# Patient Record
Sex: MALE | Race: WHITE | Employment: OTHER | URBAN - METROPOLITAN AREA
[De-identification: names, ages, dates, MRNs, and addresses within clinical notes are randomized per-mention and may not be internally consistent; named-entity substitution may affect disease eponyms.]

---

## 2022-06-22 ENCOUNTER — APPOINTMENT (OUTPATIENT)
Dept: GENERAL RADIOLOGY | Age: 67
End: 2022-06-22
Payer: MEDICARE

## 2022-06-22 ENCOUNTER — APPOINTMENT (OUTPATIENT)
Dept: CT IMAGING | Age: 67
End: 2022-06-22
Payer: MEDICARE

## 2022-06-22 ENCOUNTER — HOSPITAL ENCOUNTER (EMERGENCY)
Age: 67
Discharge: HOME | End: 2022-06-22
Attending: EMERGENCY MEDICINE
Payer: MEDICARE

## 2022-06-22 VITALS
HEART RATE: 55 BPM | WEIGHT: 220 LBS | BODY MASS INDEX: 31.57 KG/M2 | RESPIRATION RATE: 18 BRPM | OXYGEN SATURATION: 95 % | TEMPERATURE: 97.5 F | SYSTOLIC BLOOD PRESSURE: 130 MMHG | DIASTOLIC BLOOD PRESSURE: 76 MMHG

## 2022-06-22 DIAGNOSIS — S42.202A CLOSED FRACTURE OF PROXIMAL END OF LEFT HUMERUS, UNSPECIFIED FRACTURE MORPHOLOGY, INITIAL ENCOUNTER: ICD-10-CM

## 2022-06-22 DIAGNOSIS — S09.90XA INJURY OF HEAD, INITIAL ENCOUNTER: ICD-10-CM
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
## 2022-06-22 DIAGNOSIS — T14.8XXA ABRASION: ICD-10-CM

## 2022-06-22 LAB
ABSOLUTE EOS #: 0.3 K/UL (ref 0–0.44)
ABSOLUTE IMMATURE GRANULOCYTE: 0.02 K/UL (ref 0–0.3)
ABSOLUTE LYMPH #: 2.11 K/UL (ref 1.1–3.7)
ABSOLUTE MONO #: 0.75 K/UL (ref 0.1–1.2)
ANION GAP SERPL CALCULATED.3IONS-SCNC: 10 MMOL/L (ref 9–17)
BASOPHILS # BLD: 1 % (ref 0–2)
BASOPHILS ABSOLUTE: 0.05 K/UL (ref 0–0.2)
BUN BLDV-MCNC: 15 MG/DL (ref 8–23)
BUN/CREAT BLD: 15 (ref 9–20)
CALCIUM SERPL-MCNC: 8.9 MG/DL (ref 8.6–10.4)
CHLORIDE BLD-SCNC: 102 MMOL/L (ref 98–107)
CO2: 26 MMOL/L (ref 20–31)
CREAT SERPL-MCNC: 0.98 MG/DL (ref 0.7–1.2)
EOSINOPHILS RELATIVE PERCENT: 4 % (ref 1–4)
GFR AFRICAN AMERICAN: >60 ML/MIN
GFR NON-AFRICAN AMERICAN: >60 ML/MIN
GFR SERPL CREATININE-BSD FRML MDRD: ABNORMAL ML/MIN/{1.73_M2}
GLUCOSE BLD-MCNC: 117 MG/DL (ref 70–99)
HCT VFR BLD CALC: 48.9 % (ref 40.7–50.3)
HEMOGLOBIN: 15.5 G/DL (ref 13–17)
IMMATURE GRANULOCYTES: 0 %
LYMPHOCYTES # BLD: 30 % (ref 24–43)
MAGNESIUM: 2.2 MG/DL (ref 1.6–2.6)
MCH RBC QN AUTO: 30.3 PG (ref 25.2–33.5)
MCHC RBC AUTO-ENTMCNC: 31.7 G/DL (ref 28.4–34.8)
MCV RBC AUTO: 95.7 FL (ref 82.6–102.9)
MONOCYTES # BLD: 11 % (ref 3–12)
NRBC AUTOMATED: 0 PER 100 WBC
PDW BLD-RTO: 12.6 % (ref 11.8–14.4)
PLATELET # BLD: 177 K/UL (ref 138–453)
PMV BLD AUTO: 10.7 FL (ref 8.1–13.5)
POTASSIUM SERPL-SCNC: 4.2 MMOL/L (ref 3.7–5.3)
RBC # BLD: 5.11 M/UL (ref 4.21–5.77)
SEG NEUTROPHILS: 54 % (ref 36–65)
SEGMENTED NEUTROPHILS ABSOLUTE COUNT: 3.77 K/UL (ref 1.5–8.1)
SODIUM BLD-SCNC: 138 MMOL/L (ref 135–144)
WBC # BLD: 7 K/UL (ref 3.5–11.3)

## 2022-06-22 PROCEDURE — 80048 BASIC METABOLIC PNL TOTAL CA: CPT

## 2022-06-22 PROCEDURE — 72125 CT NECK SPINE W/O DYE: CPT

## 2022-06-22 PROCEDURE — 85025 COMPLETE CBC W/AUTO DIFF WBC: CPT

## 2022-06-22 PROCEDURE — 96375 TX/PRO/DX INJ NEW DRUG ADDON: CPT

## 2022-06-22 PROCEDURE — 96374 THER/PROPH/DIAG INJ IV PUSH: CPT

## 2022-06-22 PROCEDURE — 71045 X-RAY EXAM CHEST 1 VIEW: CPT

## 2022-06-22 PROCEDURE — 70450 CT HEAD/BRAIN W/O DYE: CPT

## 2022-06-22 PROCEDURE — 99285 EMERGENCY DEPT VISIT HI MDM: CPT

## 2022-06-22 PROCEDURE — 93005 ELECTROCARDIOGRAM TRACING: CPT | Performed by: NURSE PRACTITIONER

## 2022-06-22 PROCEDURE — 6360000002 HC RX W HCPCS: Performed by: NURSE PRACTITIONER

## 2022-06-22 PROCEDURE — 73080 X-RAY EXAM OF ELBOW: CPT

## 2022-06-22 PROCEDURE — 72170 X-RAY EXAM OF PELVIS: CPT

## 2022-06-22 PROCEDURE — 73030 X-RAY EXAM OF SHOULDER: CPT

## 2022-06-22 PROCEDURE — 83735 ASSAY OF MAGNESIUM: CPT

## 2022-06-22 RX ORDER — HYDROCODONE BITARTRATE AND ACETAMINOPHEN 5; 325 MG/1; MG/1
1 TABLET ORAL EVERY 8 HOURS PRN
Qty: 20 TABLET | Refills: 0 | Status: SHIPPED | OUTPATIENT
Start: 2022-06-22 | End: 2022-06-29

## 2022-06-22 RX ORDER — ONDANSETRON 2 MG/ML
4 INJECTION INTRAMUSCULAR; INTRAVENOUS ONCE
Status: COMPLETED | OUTPATIENT
Start: 2022-06-22 | End: 2022-06-22

## 2022-06-22 RX ORDER — MORPHINE SULFATE 2 MG/ML
2 INJECTION, SOLUTION INTRAMUSCULAR; INTRAVENOUS ONCE
Status: COMPLETED | OUTPATIENT
Start: 2022-06-22 | End: 2022-06-22

## 2022-06-22 RX ADMIN — MORPHINE SULFATE 2 MG: 2 INJECTION, SOLUTION INTRAMUSCULAR; INTRAVENOUS at 11:15

## 2022-06-22 RX ADMIN — ONDANSETRON 4 MG: 2 INJECTION INTRAMUSCULAR; INTRAVENOUS at 11:15

## 2022-06-22 ASSESSMENT — ENCOUNTER SYMPTOMS
COLOR CHANGE: 0
TROUBLE SWALLOWING: 0
FACIAL SWELLING: 0
ABDOMINAL PAIN: 0
NAUSEA: 0
SHORTNESS OF BREATH: 0
VOMITING: 0

## 2022-06-22 ASSESSMENT — PAIN SCALES - GENERAL: PAINLEVEL_OUTOF10: 6

## 2022-06-22 NOTE — ED PROVIDER NOTES
eMERGENCY dEPARTMENT eNCOUnter   Independent Attestation     Pt Name: Hermila Mckeon  MRN: [id number]  [place] 1955  Date of evaluation: 6/22/22     Hermila Mckeon is a 77 y.o. male with CC: Fall        This visit was performed by both a physician and an APC. I performed all aspects of the MDM as documented.       The care is provided during an unprecedented national emergency due to the novel coronavirus, Sarah Beth Hammer MD  Attending Emergency Physician           Lizabeth Gutiérrez MD  06/22/22 [id number]

## 2022-06-22 NOTE — ED PROVIDER NOTES
[facility] ED  eMERGENCY dEPARTMENT eNCOUnter      Pt Name: Peter العراقي  MRN: [id number]  [place] 1955  Date of evaluation: 6/22/2022  Provider: BAM Davis CNP    CHIEF COMPLAINT       Chief Complaint   Patient presents with    Fall         HISTORY OF PRESENT ILLNESS  (Location/Symptom, Timing/Onset, Context/Setting, Quality, Duration, Modifying Factors, Severity.)   Peter العراقي is a 77 y.o. male who presents to the emergency department via private auto for a fall x2 today. He is accompanied by his caregiver. He fell coming out of his bedroom this morning, landing on his left side. His caregiver denies LOC. He then fell again in the grass while walking outside to their transport vehicle. He has an abrasion to his right forehead and scalp area from the second fall. He reported mild dizziness. He has a hx of syncope. Denies pain to his neck, back, chest, abdomen, hips, knees. Rates his pain 6/10 at this time. Nursing Notes were reviewed. ALLERGIES     Patient has no known allergies. CURRENT MEDICATIONS       Previous Medications    No medications on file       PAST MEDICAL HISTORY         Diagnosis Date    Diverticulosis     GERD (gastroesophageal reflux disease)     Mental retardation        SURGICAL HISTORY     No past surgical history on file. FAMILY HISTORY     No family history on file. No family status information on file. SOCIAL HISTORY      reports that he has never smoked. He does not have any smokeless tobacco history on file. He reports that he does not drink alcohol and does not use drugs. REVIEW OF SYSTEMS    (2-9 systems for level 4, 10 or more for level 5)     Review of Systems   Constitutional: Negative for chills, diaphoresis, fatigue and fever. HENT: Negative for facial swelling, nosebleeds and trouble swallowing. Respiratory: Negative for shortness of breath. Cardiovascular: Negative for chest pain.    Gastrointestinal: Negative for abdominal pain, nausea and vomiting. Musculoskeletal: Positive for arthralgias and myalgias. Skin: Positive for wound. Negative for color change and rash. Neurological: Positive for headaches. Negative for dizziness, syncope, facial asymmetry, speech difficulty, weakness, light-headedness and numbness. Except as noted above the remainder of the review of systems was reviewed and negative. PHYSICAL EXAM    (up to 7 for level 4, 8 or more for level 5)     ED Triage Vitals   BP Temp Temp src Pulse Resp SpO2 Height Weight   -- -- -- -- -- -- -- --     Physical Exam  Vitals reviewed. Constitutional:       General: He is not in acute distress. Appearance: He is well-developed. He is not diaphoretic. HENT:      Head: Abrasion present. No raccoon eyes or Tipton's sign. Right Ear: External ear normal.      Left Ear: External ear normal.      Nose:      Right Nostril: No epistaxis. Left Nostril: No epistaxis. Eyes:      General: No scleral icterus. Extraocular Movements: Extraocular movements intact. Conjunctiva/sclera: Conjunctivae normal.      Pupils: Pupils are equal, round, and reactive to light. Cardiovascular:      Rate and Rhythm: Normal rate. Pulmonary:      Effort: Pulmonary effort is normal. No respiratory distress. Breath sounds: No stridor. Abdominal:      General: There is no distension. Palpations: Abdomen is soft. Tenderness: There is no abdominal tenderness. There is no guarding. Musculoskeletal:      Right shoulder: No swelling, deformity or tenderness. Normal range of motion. Left shoulder: Tenderness present. No swelling or deformity. Decreased range of motion. Right elbow: No swelling or deformity. Normal range of motion. No tenderness. Left elbow: No swelling or deformity. Decreased range of motion. No tenderness. Right wrist: No swelling, deformity or tenderness. Normal range of motion. Normal pulse. Left wrist: No swelling, deformity or tenderness. Normal range of motion. Normal pulse. Right hand: No swelling, deformity or tenderness. Normal range of motion. Normal capillary refill. Normal pulse. Left hand: No swelling, deformity or tenderness. Normal range of motion. Normal capillary refill. Normal pulse. Cervical back: Neck supple. No swelling, deformity, tenderness or bony tenderness. Thoracic back: No swelling, deformity, tenderness or bony tenderness. Lumbar back: No swelling, deformity, tenderness or bony tenderness. Right hip: No deformity or tenderness. Normal range of motion. Left hip: No deformity or tenderness. Normal range of motion. Right knee: No swelling or deformity. Normal range of motion. No tenderness. Left knee: No swelling or deformity. Normal range of motion. No tenderness. Skin:     General: Skin is warm and dry. Capillary Refill: Capillary refill takes less than 2 seconds. Findings: No rash. Neurological:      Mental Status: He is alert and oriented to person, place, and time. GCS: GCS eye subscore is 4. GCS verbal subscore is 5. GCS motor subscore is 6. Cranial Nerves: Cranial nerves are intact. No cranial nerve deficit. Motor: Motor function is intact. No weakness. Coordination: Coordination is intact. Psychiatric:         Behavior: Behavior normal.           DIAGNOSTIC RESULTS     RADIOLOGY:   Non-plain film images such as CT, Ultrasound and MRI are read by the radiologist. Plain radiographic images are visualized and preliminarily interpreted by the emergency physician with the below findings:    Interpretation per the Radiologist below, if available at the time of this note:    XR PELVIS (1-2 VIEWS)    Result Date: 6/22/2022  EXAMINATION: ONE XRAY VIEW OF THE PELVIS 6/22/2022 9:50 am COMPARISON: None.  HISTORY: ORDERING SYSTEM PROVIDED HISTORY: fall TECHNOLOGIST PROVIDED HISTORY: fall Reason for Exam: Fall with hip pain and SOB. FINDINGS: The bony pelvis is intact. There is no acute osseous abnormality. There is degenerative change of the SI joints and of the hip joints. The surrounding soft tissues are unremarkable. No acute osseous or soft tissue abnormality. XR ELBOW LEFT (MIN 3 VIEWS)    Result Date: 6/22/2022  EXAMINATION: THREE XRAY VIEWS OF THE LEFT ELBOW 6/22/2022 10:32 am COMPARISON: None. HISTORY: ORDERING SYSTEM PROVIDED HISTORY: pain, fall TECHNOLOGIST PROVIDED HISTORY: pain, fall Reason for Exam: Fall with pain to left shoulder. Limited ROM. Best films. FINDINGS: No fracture, dislocation, or joint effusion. No acute findings     CT HEAD WO CONTRAST    Result Date: 6/22/2022  EXAMINATION: CT OF THE HEAD WITHOUT CONTRAST  6/22/2022 10:03 am TECHNIQUE: CT of the head was performed without the administration of intravenous contrast. Automated exposure control, iterative reconstruction, and/or weight based adjustment of the mA/kV was utilized to reduce the radiation dose to as low as reasonably achievable. COMPARISON: None. HISTORY: ORDERING SYSTEM PROVIDED HISTORY: injury, weakness TECHNOLOGIST PROVIDED HISTORY: injury, weakness Decision Support Exception - unselect if not a suspected or confirmed emergency medical condition->Emergency Medical Condition (MA) Reason for Exam: Pt fell 2 times today, abrasion to forehead. FINDINGS: BRAIN/VENTRICLES: There is no acute intracranial hemorrhage, mass effect or midline shift. No abnormal extra-axial fluid collection. The gray-white differentiation is maintained without evidence of an acute infarct. There is no evidence of hydrocephalus. ORBITS: The visualized portion of the orbits demonstrate no acute abnormality. SINUSES: The visualized paranasal sinuses and mastoid air cells demonstrate no acute abnormality. SOFT TISSUES/SKULL:  No acute abnormality of the visualized skull or soft tissues. No acute intracranial abnormality. RECOMMENDATIONS: Unavailable     CT CERVICAL SPINE WO CONTRAST    Result Date: 6/22/2022  EXAMINATION: CT OF THE CERVICAL SPINE WITHOUT CONTRAST 6/22/2022 10:03 am TECHNIQUE: CT of the cervical spine was performed without the administration of intravenous contrast. Multiplanar reformatted images are provided for review. Automated exposure control, iterative reconstruction, and/or weight based adjustment of the mA/kV was utilized to reduce the radiation dose to as low as reasonably achievable. COMPARISON: None. HISTORY: ORDERING SYSTEM PROVIDED HISTORY: fall TECHNOLOGIST PROVIDED HISTORY: fall Decision Support Exception - unselect if not a suspected or confirmed emergency medical condition->Emergency Medical Condition (MA) Reason for Exam: Pt fell 2 times today, abrasion to forehead. FINDINGS: BONES/ALIGNMENT: There is no acute fracture or traumatic malalignment. DEGENERATIVE CHANGES: Mild-to-moderate degenerative change throughout most significant C5-6 C6-7 with loss disc height endplate spondylosis. SOFT TISSUES: There is no prevertebral soft tissue swelling. No acute abnormality of the cervical spine. XR SHOULDER LEFT (MIN 2 VIEWS)    Result Date: 6/22/2022  EXAMINATION: 2 XRAY VIEWS OF THE LEFT SHOULDER 6/22/2022 10:32 am COMPARISON: None. HISTORY: ORDERING SYSTEM PROVIDED HISTORY: pain, fall TECHNOLOGIST PROVIDED HISTORY: pain, fall Reason for Exam: Fall with pain to left shoulder. Limited ROM. Best films. FINDINGS: There is a mildly impacted fracture of the proximal left humerus. The joint spaces are maintained. The surrounding soft tissues are unremarkable. Mildly impacted fracture of the proximal left humerus. XR CHEST PORTABLE    Result Date: 6/22/2022  EXAMINATION: ONE XRAY VIEW OF THE CHEST 6/22/2022 9:51 am COMPARISON: Chest radiograph performed 05/05/2015. HISTORY: ORDERING SYSTEM PROVIDED HISTORY: fall TECHNOLOGIST PROVIDED HISTORY: fall Reason for Exam: Fall with hip pain and SOB. FINDINGS: There is no acute consolidation or effusion. There is no pneumothorax. The mediastinal structures are unremarkable. The upper abdomen unremarkable. The extrathoracic soft tissues are unremarkable. No acute cardiopulmonary process. LABS:  Labs Reviewed   BASIC METABOLIC PANEL - Abnormal; Notable for the following components:       Result Value    Glucose 117 (*)     All other components within normal limits   MAGNESIUM   CBC WITH AUTO DIFFERENTIAL       All other labs were within normal range or not returned as of this dictation. EMERGENCY DEPARTMENT COURSE and DIFFERENTIAL DIAGNOSIS/MDM:   Vitals:    Vitals:    06/22/22 0902 06/22/22 0914   BP: 130/76    Pulse: 55    Resp: 18    Temp:  97.5 °F (36.4 °C)   TempSrc:  Oral   SpO2: 95%    Weight:  220 lb (99.8 kg)     MEDICATIONS GIVEN IN THE ED:  Medications   ondansetron (ZOFRAN) injection 4 mg (has no administration in time range)   morphine (PF) injection 2 mg (has no administration in time range)       CLINICAL DECISION MAKING:  The patient presented alert with a nontoxic appearance and was seen in conjunction with Dr. Armand Laureano. Imaging showed a mildly impacted fracture of the proximal left humerus. A sling and swathe was applied. The application was checked and was appropriate; the LUE remained NVI. His caregiver was present for a ride home. OARRS was reviewed. A prescription was written for  Norco. The patient was advised to not drink alcohol, drive, or operate heavy machinery while taking the medication. Follow up with pcp for a recheck. Evaluation and treatment course in the ED, and plan of care upon discharge was discussed in length with the patient. Patient had no further questions prior to being discharged and was instructed to return to the ED for new or worsening symptoms. Care was provided during an unprecedented national emergency due to the novel coronavirus, Covid-19. FINAL IMPRESSION      1.  Fall, initial encounter 2. Injury of head, initial encounter    3. Closed fracture of proximal end of left humerus, unspecified fracture morphology, initial encounter    4. Abrasion          DISPOSITION/PLAN   DISPOSITION Decision To Discharge 06/22/2022 11:04:57 AM      PATIENT REFERRED TO:   Yeimi Allred MD  [address]  [phone]    Schedule an appointment as soon as possible for a visit       Jesus Tuttle MD  [facility] [address]  [address] [facility] [address]    Schedule an appointment as soon as possible for a visit       [facility] ED  [address] [facility]  [phone]    As needed, If symptoms worsen      DISCHARGE MEDICATIONS:     New Prescriptions    HYDROCODONE-ACETAMINOPHEN (NORCO) 5-325 MG PER TABLET    Take 1 tablet by mouth every 8 hours as needed for Pain for up to 7 days.            (Please note that portions of this note were completed with a voice recognition program.  Efforts were made to edit the dictations but occasionally words are mis-transcribed.)    Bobby Martínez, [address] [facility], BAM - CNP  06/22/22 2014

## 2022-06-23 ENCOUNTER — OFFICE VISIT (OUTPATIENT)
Dept: ORTHOPEDIC SURGERY | Age: 67
End: 2022-06-23
Payer: MEDICARE

## 2022-06-23 VITALS — BODY MASS INDEX: 31.5 KG/M2 | WEIGHT: 220 LBS | HEIGHT: 70 IN

## 2022-06-23 DIAGNOSIS — S42.202A CLOSED FRACTURE OF PROXIMAL END OF LEFT HUMERUS, UNSPECIFIED FRACTURE MORPHOLOGY, INITIAL ENCOUNTER: ICD-10-CM

## 2022-06-23 DIAGNOSIS — M25.512 LEFT SHOULDER PAIN, UNSPECIFIED CHRONICITY: Primary | ICD-10-CM

## 2022-06-23 PROCEDURE — 3017F COLORECTAL CA SCREEN DOC REV: CPT | Performed by: STUDENT IN AN ORGANIZED HEALTH CARE EDUCATION/TRAINING PROGRAM

## 2022-06-23 PROCEDURE — 1123F ACP DISCUSS/DSCN MKR DOCD: CPT | Performed by: STUDENT IN AN ORGANIZED HEALTH CARE EDUCATION/TRAINING PROGRAM

## 2022-06-23 PROCEDURE — G8417 CALC BMI ABV UP PARAM F/U: HCPCS | Performed by: STUDENT IN AN ORGANIZED HEALTH CARE EDUCATION/TRAINING PROGRAM

## 2022-06-23 PROCEDURE — 4004F PT TOBACCO SCREEN RCVD TLK: CPT | Performed by: STUDENT IN AN ORGANIZED HEALTH CARE EDUCATION/TRAINING PROGRAM

## 2022-06-23 PROCEDURE — 99203 OFFICE O/P NEW LOW 30 MIN: CPT | Performed by: STUDENT IN AN ORGANIZED HEALTH CARE EDUCATION/TRAINING PROGRAM

## 2022-06-23 PROCEDURE — G8427 DOCREV CUR MEDS BY ELIG CLIN: HCPCS | Performed by: STUDENT IN AN ORGANIZED HEALTH CARE EDUCATION/TRAINING PROGRAM

## 2022-06-23 NOTE — PROGRESS NOTES
[address] [facility]  [facility] [facility] [address]  Dept: [address] [facility] Patient Visit      Subjective:   CHIEF COMPLAINT:    Chief Complaint   Patient presents with    Follow-Up from Hospital     ED   l humerus fx 6/22/2022       HISTORY OF PRESENT ILLNESS:    The patient is a 77 y.o. RHD male who is being seen as a new patient for right proximal humerus fracture which was sustained after a fall from standing height yesterday. The patient is nonverbal and history was obtained from medical liaison who accompanied the patient. Per liaison, the patient lives at [facility] and was getting onto a bus when he tripped and fell. Liaison believes the fall happened in the hallway prior to getting onto the bus. The patient indicated that his arm was painful which prompted visit to the emergency department. He was found to have a proximal humerus fracture on the left and therefore was referred to our office for further evaluation. He was provided with a sling in the emergency department. Unable to obtain subjective history today secondary to patient nonverbal status. Past Medical History:    Past Medical History:   Diagnosis Date    Diverticulosis     GERD (gastroesophageal reflux disease)     Mental retardation        Past Surgical History:    History reviewed. No pertinent surgical history. Current Medications:   Current Outpatient Medications   Medication Sig Dispense Refill    HYDROcodone-acetaminophen (NORCO) 5-325 MG per tablet Take 1 tablet by mouth every 8 hours as needed for Pain for up to 7 days. 20 tablet 0     No current facility-administered medications for this visit. Allergies:    Patient has no known allergies.     Social History:   Social History     Socioeconomic History    Marital status: Single     Spouse name: Not on file    Number of children: Not on file    Years of education: Not on file    Highest education level: Not on file   Occupational History    Not on file   Tobacco Use    Smoking status: Never Smoker    Smokeless tobacco: Not on file   Substance and Sexual Activity    Alcohol use: No    Drug use: No    Sexual activity: Not on file   Other Topics Concern    Not on file   Social History Narrative    Not on file     Social Determinants of Health     Financial Resource Strain:     Difficulty of Paying Living Expenses: Not on file   Food Insecurity:     Worried About Running Out of Food in the Last Year: Not on file    Summer of Food in the Last Year: Not on file   Transportation Needs:     Lack of Transportation (Medical): Not on file    Lack of Transportation (Non-Medical): Not on file   Physical Activity:     Days of Exercise per Week: Not on file    Minutes of Exercise per Session: Not on file   Stress:     Feeling of Stress : Not on file   Social Connections:     Frequency of Communication with Friends and Family: Not on file    Frequency of Social Gatherings with Friends and Family: Not on file    Attends [employer] Services: Not on file    Active Member of [address] [place] [address] or Organizations: Not on file    Attends Club or Organization Meetings: Not on file    Marital Status: Not on file   Intimate Partner Violence:     Fear of Current or Ex-Partner: Not on file    Emotionally Abused: Not on file    Physically Abused: Not on file    Sexually Abused: Not on file   Housing Stability:     Unable to Pay for Housing in the Last Year: Not on file    Number of [place] in the Last Year: Not on file    Unstable Housing in the Last Year: Not on file       Family History:  History reviewed. No pertinent family history. REVIEW OF SYSTEMS:  Unable to obtain secondary to patient non-verbal status.      Objective :   PHYSICAL EXAM:  Gen: Non-verbal, appears to be in no acute distress  Cardio: regular rate   Lungs: symmetrical chest expansion   MSK: Left upper extremity: Sling on, which was removed for evaluation today. Ecchymoses present along medial aspect of humerus. Patient winces with palpation at the level of the proximal humerus. Skin intact. Demonstrates ability to move digits, wrist and elbow without difficulty. AIN/PIN/Med/Uln/Rad gross motor intact. 2+ radial pulse. Radiology:   History: Left shoulder pain     Comparison: None    Findings: 1 view (Axillary) of the left shoulder showing evidence of a left proximal humerus fracture. Humeral head maintains within the joint and congruent with glenoid. No other fractures noted. Impression: Left proximal humerus fracture       AP and scapular Y x-rays from 6/22/22 were reviewed which also demonstrate proximal humerus fracture. Assessment:   77y.o. year old male with the following:      Diagnosis Orders   1. Left shoulder pain, unspecified chronicity  XR SHOULDER LEFT 1 VW    Vitamin D 25 hydroxy   2. Closed fracture of proximal end of left humerus, unspecified fracture morphology, initial encounter  Vitamin D 25 hydroxy        Plan:      Patient seen and examined today. Discussed with the patient and his medical liaison that at this point, we can attempt management of left proximal humerus fracture with non-operative means. We encouraged use of the sling at all times except for hygiene and he may take it off for sleeping. We did order a vitamin D as well. We will see the patient back in 2 weeks for repeat x-rays. Patient's liaison verbalized an understanding. Return in about 2 weeks (around 7/7/2022) for Reassessment, with X-Ray. No orders of the defined types were placed in this encounter.       Orders Placed This Encounter   Procedures    XR SHOULDER LEFT 1 VW     Standing Status:   Future     Number of Occurrences:   1     Standing Expiration Date:   6/23/2023     Scheduling Instructions:      Axillary view only please    Vitamin D 25 hydroxy        Electronically signed by Ricardo Doyle DO on6/23/2022 at 3:58 PM

## 2022-06-24 LAB
EKG ATRIAL RATE: 68 BPM
EKG P AXIS: 5 DEGREES
EKG P-R INTERVAL: 178 MS
EKG Q-T INTERVAL: 374 MS
EKG QRS DURATION: 104 MS
EKG QTC CALCULATION (BAZETT): 397 MS
EKG R AXIS: 2 DEGREES
EKG T AXIS: 19 DEGREES
EKG VENTRICULAR RATE: 68 BPM

## 2022-06-24 PROCEDURE — 93010 ELECTROCARDIOGRAM REPORT: CPT | Performed by: INTERNAL MEDICINE

## 2022-07-05 DIAGNOSIS — S42.202A CLOSED FRACTURE OF PROXIMAL END OF LEFT HUMERUS, UNSPECIFIED FRACTURE MORPHOLOGY, INITIAL ENCOUNTER: Primary | ICD-10-CM

## 2022-07-06 ENCOUNTER — OFFICE VISIT (OUTPATIENT)
Dept: ORTHOPEDIC SURGERY | Age: 67
End: 2022-07-06
Payer: MEDICARE

## 2022-07-06 DIAGNOSIS — S42.202D CLOSED FRACTURE OF PROXIMAL END OF LEFT HUMERUS WITH ROUTINE HEALING, UNSPECIFIED FRACTURE MORPHOLOGY, SUBSEQUENT ENCOUNTER: Primary | ICD-10-CM

## 2022-07-06 PROCEDURE — 4004F PT TOBACCO SCREEN RCVD TLK: CPT | Performed by: STUDENT IN AN ORGANIZED HEALTH CARE EDUCATION/TRAINING PROGRAM

## 2022-07-06 PROCEDURE — 99213 OFFICE O/P EST LOW 20 MIN: CPT | Performed by: STUDENT IN AN ORGANIZED HEALTH CARE EDUCATION/TRAINING PROGRAM

## 2022-07-06 PROCEDURE — G8417 CALC BMI ABV UP PARAM F/U: HCPCS | Performed by: STUDENT IN AN ORGANIZED HEALTH CARE EDUCATION/TRAINING PROGRAM

## 2022-07-06 PROCEDURE — 1123F ACP DISCUSS/DSCN MKR DOCD: CPT | Performed by: STUDENT IN AN ORGANIZED HEALTH CARE EDUCATION/TRAINING PROGRAM

## 2022-07-06 PROCEDURE — 3017F COLORECTAL CA SCREEN DOC REV: CPT | Performed by: STUDENT IN AN ORGANIZED HEALTH CARE EDUCATION/TRAINING PROGRAM

## 2022-07-06 PROCEDURE — G8427 DOCREV CUR MEDS BY ELIG CLIN: HCPCS | Performed by: STUDENT IN AN ORGANIZED HEALTH CARE EDUCATION/TRAINING PROGRAM

## 2022-07-06 NOTE — PROGRESS NOTES
[address]  [address] [facility] [address]  Dept: [phone]  Dept Fax: [phone]        Ambulatory Follow Up    Subjective:     Chief Complaint   Patient presents with    Follow-up     lEFT SHOULDER        HPI:  Alexus Willard is a 77y.o. year old male who presents to our office today for routine followup regarding: left proximal humerus fracture sustained on 6/22/2022 treated non-operatively. He is non-verbal and patient status was relayed through the medical liasion. His pain level has decreased and he has not reported any numbness or tingling of the left upper extremity. Patient has not required any pain medication. Review of Systems:  Constitutional: Negative for fever and chills. HENT: Negative for congestion. Eyes: Negative for blurred vision and double vision. Respiratory: Negative for cough, shortness of breath and wheezing. Cardiovascular: Negative for chest pain and palpitations. Gastrointestinal: Negative for nausea. Negative for vomiting. Musculoskeletal: Positive for left shoulder pain. Skin: Negative for itching and rash. Neurological: Negative for dizziness, sensory change and headaches. Psychiatric/Behavioral: Negative for depression and suicidal ideas. Objective :   General: AAOx3, NAD, appears stated age. Patient is non-verbal and requires assistance with understanding the medical plan. CV: no obvious JVD, no dependent edema, distal pulses 2+  Respiratory: chest rise symmetric, unlabored respirations, no audible wheezing  Skin: warm, well perfused, no obvious rashes or lesions    MSK:  Left upper extremity: Sling on, which was removed for evaluation. Skin intact, warm, and well perfused. No ecchymoses present. Patient is tender to palpation around the shoulder. AIN/PIN/Med/Uln/Rad gross motor intact. Median/Radiaul/Ulnar nerves are SILT. 2+ radial pulse with BCR.         Radiology: History: Left proximal humerus fracture    Comparison:   6/23/2022 & 6/22/2022   3 views of the left shoulder demonstrating left proximal humerus fracture. Humeral head maintained within the joint and congruent with the glenoid. No other acute osseous abnormalities noted. Findings:   3 views of the left humerus demonstrating a proximal humerus fracture around the anatomical neck. Alignment has been maintained from previous xrays with no further shortening or rotation of the humeral shaft. The humeral head is congruent with the glenoid. There is minimal callous formation. Impression:  Well maintained left proximal humerus fracture    Assessment:   1. Left proximal humerus fracture    Plan: We advised the nursing staff to continue non-weightbearing and for the patient to continue wearing his sling with permission to remove for hygiene and exercises. We recommended pendulum home exercises but advised no active shoulder motion at this time. We would like for him to follow-up at 2 weeks to reassess with x-rays. A nursing home form was completed for these recommendations.        Ulises Marie DO  Orthopedic Surgery Resident, PGY-1  [facility]

## 2022-07-20 NOTE — PROGRESS NOTES
I performed a history and physical examination of the patient and discussed management with the resident. I reviewed the physician assistant/resident physician note and agree with the documented findings and plan of care. Any areas of disagreement are noted on the chart. I have personally evaluated this patient and have completed at least one if not all key elements of the E/M (history, physical exam, and MDM). Additional findings are as noted. I agree with the chief complaint, past medical history, past surgical history, allergies, medications, social and family history as documented unless otherwise noted below.      Electronically signed by Chidi Mancini DO on 7/20/2022 at 8:50 AM

## 2022-07-22 DIAGNOSIS — M25.512 LEFT SHOULDER PAIN, UNSPECIFIED CHRONICITY: Primary | ICD-10-CM

## 2022-07-27 ENCOUNTER — OFFICE VISIT (OUTPATIENT)
Dept: ORTHOPEDIC SURGERY | Age: 67
End: 2022-07-27
Payer: MEDICARE

## 2022-07-27 VITALS — BODY MASS INDEX: 31.5 KG/M2 | HEIGHT: 70 IN | WEIGHT: 220 LBS

## 2022-07-27 DIAGNOSIS — S42.202D CLOSED FRACTURE OF PROXIMAL END OF LEFT HUMERUS WITH ROUTINE HEALING, UNSPECIFIED FRACTURE MORPHOLOGY, SUBSEQUENT ENCOUNTER: Primary | ICD-10-CM

## 2022-07-27 PROCEDURE — 99213 OFFICE O/P EST LOW 20 MIN: CPT | Performed by: STUDENT IN AN ORGANIZED HEALTH CARE EDUCATION/TRAINING PROGRAM

## 2022-07-27 PROCEDURE — 1123F ACP DISCUSS/DSCN MKR DOCD: CPT | Performed by: STUDENT IN AN ORGANIZED HEALTH CARE EDUCATION/TRAINING PROGRAM

## 2022-07-27 PROCEDURE — 4004F PT TOBACCO SCREEN RCVD TLK: CPT | Performed by: STUDENT IN AN ORGANIZED HEALTH CARE EDUCATION/TRAINING PROGRAM

## 2022-07-27 PROCEDURE — G8427 DOCREV CUR MEDS BY ELIG CLIN: HCPCS | Performed by: STUDENT IN AN ORGANIZED HEALTH CARE EDUCATION/TRAINING PROGRAM

## 2022-07-27 PROCEDURE — G8417 CALC BMI ABV UP PARAM F/U: HCPCS | Performed by: STUDENT IN AN ORGANIZED HEALTH CARE EDUCATION/TRAINING PROGRAM

## 2022-07-27 PROCEDURE — 3017F COLORECTAL CA SCREEN DOC REV: CPT | Performed by: STUDENT IN AN ORGANIZED HEALTH CARE EDUCATION/TRAINING PROGRAM

## 2022-07-27 NOTE — PROGRESS NOTES
[address]  [address] [facility] [address]  Dept: [phone]  Dept Fax: [phone]        Ambulatory Follow Up    Subjective:     Chief Complaint   Patient presents with    Follow-up     L shoulder          HPI:  Jon Rajput is a 77y.o. year old male who presents to our office today for routine followup regarding: left proximal humerus fracture sustained on 6/22/2022 treated non-operatively. He is non-verbal and patient status was relayed through the medical liasion. His pain level has decreased and he has not reported any numbness or tingling of the left upper extremity. Patient has not required any pain medication. Today patient seems to be doing well and is much more responsive on exam. Medical liasion is unsure if patient has been participating in home pendulum exercises. Review of Systems:  Constitutional: Negative for fever and chills. HENT: Negative for congestion. Eyes: Negative for blurred vision and double vision. Respiratory: Negative for cough, shortness of breath and wheezing. Cardiovascular: Negative for chest pain and palpitations. Gastrointestinal: Negative for nausea. Negative for vomiting. Musculoskeletal: Positive for left shoulder pain. Skin: Negative for itching and rash. Neurological: Negative for dizziness, sensory change and headaches. Psychiatric/Behavioral: Negative for depression and suicidal ideas. Objective :   General: AAOx3, NAD, appears stated age. Patient is non-verbal and requires assistance with understanding the medical plan. CV: no obvious JVD, no dependent edema, distal pulses 2+  Respiratory: chest rise symmetric, unlabored respirations, no audible wheezing  Skin: warm, well perfused, no obvious rashes or lesions    MSK:  Left upper extremity: Sling on, which was removed for evaluation. Skin intact, warm, and well perfused. No ecchymoses present.   Patient is tender to palpation around the shoulder, but much decreased compared to previous exam on 7/06. AIN/PIN/Med/Uln/Rad gross motor intact. Median/Radiaul/Ulnar nerves are SILT. 2+ radial pulse with BCR. Radiology:   History: Left proximal humerus fracture    Comparison:   6/23/2022 & 6/22/2022   3 views (AP, scapular Y, axillary) of the left shoulder demonstrating left proximal humerus fracture. Humeral head maintained within the joint and congruent with the glenoid. No other acute osseous abnormalities noted. 6/30/22  3 views (AP, scapular Y, axillary) of the left humerus demonstrating a proximal humerus fracture around the anatomical neck. Alignment has been maintained from previous xrays with no further shortening or rotation of the humeral shaft. The humeral head is congruent with the glenoid. There is minimal callous formation. Findings:   3 views (AP, scapular Y, axillary) of the left humerus demonstrating a valgus impacted proximal humerus fracture around the anatomical neck. Alignment has been maintained from previous x-rays with no further shortening or rotation of the humeral shaft. The humeral head is congruent with the glenoid. There is appropriate callous formation. Possible cyst with sclerotic rim at the base of the coracoid seen on AP view. No clinical correlation at this time. Impression:  Well maintained left proximal humerus fracture    Assessment:   1. Left proximal humerus fracture    Plan: We advised the nursing staff to discontinue use of the sling and that he is now able to do gentle range of motion of the shoulder. We recommended pendulum home exercises and gentle active range of motion. We would like for him to follow-up at 6 weeks to reassess. A nursing home form was completed for these recommendations.        Hernán Salmeron DO  Orthopedic Surgery Resident, PGY-1  [facility]

## 2022-09-07 ENCOUNTER — OFFICE VISIT (OUTPATIENT)
Dept: ORTHOPEDIC SURGERY | Age: 67
End: 2022-09-07
Payer: MEDICARE

## 2022-09-07 VITALS — WEIGHT: 220 LBS | BODY MASS INDEX: 31.5 KG/M2 | HEIGHT: 70 IN

## 2022-09-07 DIAGNOSIS — S42.202A CLOSED FRACTURE OF PROXIMAL END OF LEFT HUMERUS, UNSPECIFIED FRACTURE MORPHOLOGY, INITIAL ENCOUNTER: Primary | ICD-10-CM

## 2022-09-07 PROCEDURE — 99213 OFFICE O/P EST LOW 20 MIN: CPT | Performed by: STUDENT IN AN ORGANIZED HEALTH CARE EDUCATION/TRAINING PROGRAM

## 2022-09-07 PROCEDURE — G8427 DOCREV CUR MEDS BY ELIG CLIN: HCPCS | Performed by: STUDENT IN AN ORGANIZED HEALTH CARE EDUCATION/TRAINING PROGRAM

## 2022-09-07 PROCEDURE — 1123F ACP DISCUSS/DSCN MKR DOCD: CPT | Performed by: STUDENT IN AN ORGANIZED HEALTH CARE EDUCATION/TRAINING PROGRAM

## 2022-09-07 PROCEDURE — G8417 CALC BMI ABV UP PARAM F/U: HCPCS | Performed by: STUDENT IN AN ORGANIZED HEALTH CARE EDUCATION/TRAINING PROGRAM

## 2022-09-07 PROCEDURE — 3017F COLORECTAL CA SCREEN DOC REV: CPT | Performed by: STUDENT IN AN ORGANIZED HEALTH CARE EDUCATION/TRAINING PROGRAM

## 2022-09-07 PROCEDURE — 4004F PT TOBACCO SCREEN RCVD TLK: CPT | Performed by: STUDENT IN AN ORGANIZED HEALTH CARE EDUCATION/TRAINING PROGRAM

## 2022-09-07 RX ORDER — ATORVASTATIN CALCIUM 10 MG/1
10 TABLET, FILM COATED ORAL DAILY
COMMUNITY

## 2022-09-07 RX ORDER — ACETAMINOPHEN 160 MG
TABLET,DISINTEGRATING ORAL
COMMUNITY

## 2022-09-07 RX ORDER — DOCUSATE SODIUM 100 MG/1
100 CAPSULE, LIQUID FILLED ORAL 2 TIMES DAILY
COMMUNITY

## 2022-09-07 RX ORDER — ASPIRIN 81 MG/1
81 TABLET ORAL DAILY
COMMUNITY

## 2022-09-07 NOTE — PROGRESS NOTES
[address]  [address] [facility] [address]  Dept: [phone]  Dept Fax: [phone]        Ambulatory Follow Up    Subjective:     Chief Complaint   Patient presents with    Follow-up     Left Shoulder     DOI: 6/22/22 - L proximal humerus fx tx nonop    HPI:  Bettyann Fabry is a 77y.o. year old male who presents to our office today for routine 11 weeks from left proximal humerus fracture treated non-operatively. He is non-verbal and patient status was relayed through the medical liasion/care giver. She reports that his pain level has decreased and he has not reported any numbness or tingling of the left upper extremity. He has been using the left arm more frequently for ADLs. Patient has not required any pain medication. Medical liasion is unsure if patient has been participating in home pendulum exercises. Review of Systems:  Constitutional: Negative for fever and chills. HENT: Negative for congestion. Eyes: Negative for blurred vision and double vision. Respiratory: Negative for cough, shortness of breath and wheezing. Cardiovascular: Negative for chest pain and palpitations. Gastrointestinal: Negative for nausea. Negative for vomiting. Musculoskeletal: Positive for left shoulder pain. Skin: Negative for itching and rash. Neurological: Negative for dizziness, sensory change and headaches. Psychiatric/Behavioral: Negative for depression and suicidal ideas. Objective :   General: AAOx3, NAD, appears stated age. Patient is non-verbal and requires assistance with understanding the medical plan. CV: no obvious JVD, no dependent edema, distal pulses 2+  Respiratory: chest rise symmetric, unlabored respirations, no audible wheezing  Skin: warm, well perfused, no obvious rashes or lesions    MSK:  Left upper extremity: Full PROM. Limited AROM: 90 deg FF, 100 deg abduction, 40 deg ER, waistline IR.  Skin intact, warm, and well perfused. No ecchymoses present. Minimal TTP at fracture site. No gross motion of the fracture site. TTP is reportedly decreased. AIN/PIN/Med/Uln/Rad gross motor intact. Median/Radiaul/Ulnar nerves are SILT. 2+ radial pulse with BCR. Radiology:   History: Left proximal humerus fracture    Comparison: 7/27/22, 7/6/22, 6/23/2022, 6/22/2022     Findings: 3 views (AP, scapular Y, axillary) of the left humerus demonstrating a slightly impacted proximal humerus fracture around the anatomical neck. Alignment has been maintained from previous x-rays with no further shortening or rotation of the humeral shaft. The humeral head is congruent with the glenoid. There is increased interval callous formation and consolidation of previous fracture edges. No acute osseous abnormalities, including new fractures or dislocation. Impression: Well-healing left proximal humerus fracture    Assessment:   Left proximal humerus fracture    Plan:   -OK for full active/passive ROM of the left shoulder. No weight bearing restrictions. Note provided to nursing facility and order for PT provided.  -Follow up in clinic as needed for ongoing deficits. Electronically signed by Kenrick Huang MD [PGY-3] at 11:53 AM 09/07/22.

## 2022-09-30 ENCOUNTER — HOSPITAL ENCOUNTER (OUTPATIENT)
Dept: PHYSICAL THERAPY | Age: 67
Setting detail: THERAPIES SERIES
Discharge: HOME | End: 2022-09-30
Payer: MEDICARE

## 2022-09-30 PROCEDURE — 97162 PT EVAL MOD COMPLEX 30 MIN: CPT

## 2022-09-30 PROCEDURE — 97110 THERAPEUTIC EXERCISES: CPT

## 2022-09-30 NOTE — CONSULTS
[x] [facility]  [address]  [phone]  F: [phone]         Physical Therapy Upper Extremity Evaluation    Date:  2022  Patient: Tavares Perez"   : 1955  MRN: [id number]  Physician: Maru Conner MD; [facility]: Medicare/Medicaid  Medical Diagnosis:  Closed fracture of proximal end of left humerus     Rehab Codes: M25.512, M25.612, M62.512, R29.3  Onset Date: 2022   Next 's appt: unknown     Subjective:   HPI/CC: Pt's caregiver Rachele Bender present w/Pt, provides all history as Pt is non-verbal (communication assessment scanned into media). She reports Pt fell 2022, broke L shoulder. Fracture was treated non-operatively. Pt wore sling was instructed in no AROM until , was NWB until 2022, needed assistance in/out of bed and w/ADLs. Pt cont w/difficulty raising arm up, can not raise as high as other arm. No complaints of pain last few days w/normal use. PMHx: [] Unremarkable [] Diabetes [] HTN  [] Pacemaker   [] MI/Heart Problems [] Cancer [] Arthritis   [x] Other:Heart Murmur, Hiatal Hernia, Hyperlipidemia, Vasovagal Syncope  Past Medical History:   Diagnosis Date    Diverticulosis     GERD (gastroesophageal reflux disease)     Mental retardation                  [x] Refer to full medical chart  In EPIC     Comorbidities:   [x] Obesity [] Dialysis  [] N/A   [] Asthma/COPD [] Dementia [] Other:   [] Stroke [] Sleep apnea [] Other:   [] Vascular disease [] Rheumatic disease [] Other:     Tests: [x] X-Ray: [] MRI:  [] Other:  From 2022 Xray report:  Impression: Well-healing left proximal humerus fracture    Medications: [x] Refer to full medical record [] None [] Other:  Allergies:      [x] Refer to full medical record [] None [] Other:    Function:  Hand Dominance  [x] Right  [] Left  Patient lives with:  [facility]    In what type of home [x] One story   [] Two story   [] Split level   Number of stairs to enter 1 or small ramp(curb), no rail   Bathroom has a []  Tub only  [] Tub/shower combo   [x] Walk in shower    [x]  Grab bars    Washing machine is on [x]  Main level   [] Second level   [] Basement   Employer NA    Job Status []  Normal duty   [] Light duty   [] Off due to condition    []  Retired   [x] Not employed   [x] Disability  [] Other:  []  Return to work:    Work activities/duties Weekdays 8-230 Pt goes to [employer], w/activities, watches TV/movies, goes on outings, does some household activities, light cleaning, laundry, indep ADLs, bathing        ADL/IADL Previous level of function Current level of function Who currently assists the patient with task   Bathing  [x] Independent  [] Assist [] Independent  [x] Assist Difficulty reaching w/ L UE for washing hair   Dress/grooming [x] Independent  [] Assist [] Independent  [x] Assist Difficulty reaching w/ L UE for dressing   Transfer/mobility [x] Independent  [] Assist [x] Independent  [] Assist    Feeding [x] Independent  [] Assist [x] Independent  [] Assist    Toileting [x] Independent  [] Assist [x] Independent  [] Assist    Driving [] Independent  [x] Assist [] Independent  [x] Assist    Housekeeping [x] Independent  [] Assist [] Independent  [x] Assist Does light cleaning of his own room, his own laundry, limited due to arm   Grocery shop/meal prep [] Independent  [x] Assist [] Independent  [x] Assist      Gait Prior level of function Current level of function    [x] Independent  [] Assist [x] Independent  [] Assist   Device: [x] Independent [x] Independent    [] Straight Cane [] Quad cane [] Straight Cane [] Quad cane    [] Standard walker [] Rolling walker   [] 4 wheeled walker [] Standard walker [] Rolling walker   [] 4 wheeled walker    [] Wheelchair [] Wheelchair     Pain:  [x] Yes  [] No Location: L shoulder  Pain Rating: (0-10 scale) 0/10 at rest   Pain altered Tx:  [] Yes  [x] No Action:    Symptoms:  [x] Improving [] Worsening [] Same    Sleep: [x] OK    [] Disturbed    Objective:     ROM  °A/P END FEEL STRENGTH TESTS (+/-) Left Right Not Tested    Left Right  Left Right Drop Arm   []   Sit Shld Flex 70°p WNL    Sulcus Sign   []   Sit Shld Abd 45° WNL    Apprehension   []   Sit Shld IR Sacral ridge  T7    Yergasons   []   Shoulder Flex 118°   * * Speeds   []   Ext      Neer   []   ABD 78°p   * Alvera Ishihara    []   ER @ 45L, 0°     Painful Arc   []   IR      Tinel   []   Supraspinatus            Infraspinatus            Serratus Ant            Pectoralis            Lats            Mid Trap            Lower Trap            Elbow Flex. Elbow Ext. Pronation            Supination            Wrist Flex. Wrist Ext. Radial Deviation            Ulnar Deviation                        p=pain-through grimaces and non-verbal cues   *Unable to test strength as Pt does not follow cues to resist.         OBSERVATION No Deficit Deficit Not Tested Comments   Forward Head [] [x] []    Rounded Shoulders [] [x] []    Kyphosis [] [x] []    Scapular Height/Position [x] [] []    Winging [x] [] []    Scapulohumeral Rhythm [x] [] []    INSPECTION/PALPATION    Grimaces w/palpation L clavicle, upper tap shoulder joint    SC/AC Joint [] [x] []    Supraspinatus [x] [] []    Biceps tendon/groove [] [x] []    Posterior shld [] [x] []    Subscapularis [x] [] []    NEUROLOGICAL       Cervical ROM/Quadrant [] [] [x]    Reflexes [] [] [x]    Compression/Distraction [] [] [x]    Sensation [] [] [x]      Functional Test: UEFI Score: 6% functionally impaired     Comments: Limited evaluation due to Pt late arrival and Pt decreased cognition. Assessment:  Patient would benefit from skilled physical therapy services in order to: decrease pain L Shoulder, increase ROM L Shoulder, increase strength L UE, and improve reaching overhead. Problems:    [x] ?  Pain: L shoulder w/use [x] ? ROM: L Shoulder  [x] ? Strength: L UE  [x] ? Function: UEFI 6% loss of UE function   [] Other:      STG: (to be met in 10 treatments)  ? Pain: Pt able to reach overhead w/out pain (noted through non-verbals)  ? ROM:L Shoulder seated flex 120°, abd 100°, IR L3; Supine flex 140°, abd 110°, ER 20° (@ 90° abd) to perform ADLs and household activities   ? Strength: Pt able to lift 3 lbs overhead to improve ADLs and activities at home   ? Function: UEFI 4% loss of UE function   Patient to be independent with home exercise program as demonstrated by performance with correct form without cues. Patient goals: Normal movement, reach overhead without pain    Rehab Potential:  [x] Good  [] Fair  [] Poor   Suggested Professional Referral:  [x] No  [] Yes:  Barriers to Goal Achievement:  [x] No  [] Yes:  Domestic Concerns:  [x] No  [] Yes:    Pt. Education:  [x] Plans/Goals, Risks/Benefits discussed  [x] Home exercise program  Method of Education: [x] Verbal  [x] Demo  [x] Written  Comprehension of Education:  [] Verbalizes understanding. [] Demonstrates understanding. [x] Needs Review. [] Demonstrates/verbalizes understanding of HEP/Ed previously given. HEP-Access Code: [email or web address]  URL: [email or web address]  Shoulder Flexion Wall Slide with Towel - 2-3 x daily - 7 x weekly - 10 reps - 5 seconds hold  Scaption Wall Slide with Towel - 2-3 x daily - 7 x weekly - 10 reps - 5 seconds hold      Treatment Plan:  [x] Therapeutic Exercise   19770  [] Iontophoresis: 4 mg/mL Dexamethasone Sodium Phosphate  mAmin  45709   [x] Therapeutic Activity  25297 [x] Vasopneumatic cold with compression  39613    [] Gait Training   22970 [x] Ultrasound   [id number]   [] Neuromuscular Re-education  16826 [] Electrical Stimulation Unattended  95381   [x] Manual Therapy  62079 [] Electrical Stimulation Attended  99017   [x] Instruction in HEP  [] Lumbar/Cervical Traction  [phone]   [] Aquatic Therapy   [id number] [x] Cold/hotpack    [] Massage   [id number]      [] Dry Needling, 1 or 2 muscles  75205   [] Biofeedback, first 15 minutes   17636  [] Biofeedback, additional 15 minutes   46287 [] Dry Needling, 3 or more muscles  85882     []  Medication allergies reviewed for use of    Dexamethasone Sodium Phosphate 4mg/ml     with iontophoresis treatments. Pt is not allergic. Frequency: 2 x/week for 10 visits    Todays Treatment:  Modalities:   Precautions:  Exercises:  Exercise Reps/ Time Weight/ Level Comments         PROM L shoulder  9 min           Wall Slides flex, scaption  5x 5sec          Other: Educated Pt and caregiver in wall slides for HEP, issued handouts. Specific Instructions for next treatment: progress shoulder stretches per Pt tolerance, add wand ex, scap retractions, door stretch       Per Order: Please work on active and passive ROM of the left shoulder. WBAT LUE. Patient is nearly 3 months out from a left proximal humerus fracture treated nonoperatively. Patient has no restrictions. Evaluation Complexity:  History (Personal factors, comorbidities) [] 0 [] 1-2 [] 3+   Exam (limitations, restrictions) [] 1-2 [] 3 [x] 4+   Clinical presentation (progression) [] Stable [x] Evolving  [] Unstable   Decision Making [] Low [x] Moderate [] High    [] Low Complexity [x] Moderate Complexity [] High Complexity       Treatment Charges: Mins Units   [x] Evaluation       []  Low       [x]  Moderate       []  High 36 1   []  Modalities     [x]  Ther Exercise 13 1   []  Manual Therapy     []  Ther Activities     []  Aquatics     []  Vasocompression     []  Other       TOTAL TREATMENT TIME: 49 min    Time in: 2119    Time Out: 1355    Electronically signed by: Taurus Rodriguez, PT          Physician Signature:________________________________Date:__________________  By signing above or cosigning this note, I have reviewed this plan of care and certify a need for medically necessary rehabilitation services.      *PLEASE SIGN ABOVE AND FAX BACK ALL PAGES*

## 2022-10-05 NOTE — FLOWSHEET NOTE
Wes Fall Risk Assessment    Patient Name:  José Antonio Figueroa  : 1955    Risk Factor Scale  Score   History of Falls [] Yes  [x] No-isolated incident, tripped stepping up onto bus 25  0 0   Secondary Diagnosis [] Yes  [x] No 15  0 0   Ambulatory Aid [] Furniture  [] Crutches/cane/walker  [x] None/bedrest/wheelchair/nurse 30  15  0 0   IV/Heparin Lock [] Yes  [x] No 20  0 0   Gait/Transferring [] Impaired  [] Weak  [x] Normal/bedrest/immobile 20  10  0 0   Mental Status [] Forgets limitations  [x] Oriented to own ability 15  0 0      Total:  0     Based on the Assessment score: check the appropriate box.     [x]  No intervention needed   Low =   Score of 0-24    []  Use standard prevention interventions Moderate =  Score of 24-44   [] Give patient handout and discuss fall prevention strategies   [] Establish goal of education for patient/family RE: fall prevention strategies    []  Use high risk prevention interventions High = Score of 45 and higher   [] Give patient handout and discuss fall prevention strategies   [] Establish goal of education for patient/family Re: fall prevention strategies   [] Discuss lifeline / other resources    Electronically signed by:   Mayte Cotter PT  Date: 10/5/2022

## 2022-10-13 ENCOUNTER — HOSPITAL ENCOUNTER (OUTPATIENT)
Dept: PHYSICAL THERAPY | Age: 67
Setting detail: THERAPIES SERIES
Discharge: HOME | End: 2022-10-13
Payer: MEDICARE

## 2022-10-13 PROCEDURE — 97140 MANUAL THERAPY 1/> REGIONS: CPT

## 2022-10-13 PROCEDURE — 97110 THERAPEUTIC EXERCISES: CPT

## 2022-10-13 NOTE — FLOWSHEET NOTE
[x] [facility]  [address]  [phone]  F: [phone]         Physical Therapy Daily Treatment Note    Date:  10/13/2022  Patient Name:  Severo Satchel"  :  1955  MRN: [id number]  Physician: Bryan Garcia MD; [address]: Medicare/Medicaid  Medical Diagnosis:  Closed fracture of proximal end of left humerus S42.                   Rehab Codes: M25.512, M25.612, M62.512, R29.3  Onset Date: 2022                    Next 's appt: unknown   Visit# / total visits: 2/10; Progress note for Medicare patient due at visit 10     Cancels/No Shows: 0/0    Subjective:  Pt's caregiver Naveen Marte present w/Pt throughout Rx. Pt is non-verbal.   Pain:  [x] Yes  [] No Location: L Shoulder  Pain Rating: (0-10 scale) 0/10  Pain altered Tx:  [x] No  [] Yes  Action:  Comments: Per caregiver Pt is working on ex at home, and staff says they are going good. Pt reports no pain, shoulder is feeling ok through non-verbals (shaking head, nodding) at start of Rx.      Objective:  Modalities:   Precautions: Heart Murmur, Hiatal Hernia, Pt is non-verbal.  Exercises:  Exercise Reps/ Time Weight/ Level Comments   UBE  4min ea ML1.0 Fwd/Rev   Corner stretch  3x 20sec Added 10/13         Wall Slides flex, scaption  10x 3sec Progressed reps 10/13   Wall circles 15x  Added 10/13, difficulty going ccw         Wand flex 15x  Added 10/13   -abd 15x 3sec    -ext 15x     -IR 15x  Behind back         ER stretch  3x 10sec On window ledge, Added 10/13,         Supine             Mobs, PROM L shoulder  13 min  All planes, UE distraction, inf, post, distraction mobs                                 Other:      Treatment Charges: Mins Units   []  Modalities     [x]  Ther Exercise 26 2   [x]  Manual Therapy 13 1   []  Ther Activities     []  Aquatics     []  Vasocompression     []  Other     Total Treatment time 39 3 Assessment: [x] Progressing toward goals. Initiated UBE, corner stretch, wall circles, standing wand ex, and ER stretch to improve reaching over head and behind back for ADLs. Cont PROM at end of Rx. Instructed pt and caregiver Pt should cont w/wall slides at home. [x] No change. Pt requires continuous verbal and tactile cues to perform ex correctly. During wall circles, Pt frequently reverts back to cw when doing ccw. Pt w/facial grimacing, nods when asked if painful at end range PROM all planes. [] Other:  [x] Patient would continue to benefit from skilled physical therapy services in order to: decrease pain L Shoulder, increase ROM L Shoulder, increase strength L UE, and improve reaching overhead. STG: (to be met in 10 treatments)  ? Pain: Pt able to reach overhead w/out pain (noted through non-verbals)  ? ROM:L Shoulder seated flex 120°, abd 100°, IR L3; Supine flex 140°, abd 110°, ER 20° (@ 90° abd) to perform ADLs and household activities   ? Strength: Pt able to lift 3 lbs overhead to improve ADLs and activities at home   ? Function: UEFI 4% loss of UE function   Patient to be independent with home exercise program as demonstrated by performance with correct form without cues. Pt. Education:  [x] Yes  [] No  [] Reviewed Prior HEP/Ed  Method of Education: [x] Verbal  [x] Demo  [] Written  Comprehension of Education: purpose, correct technique ex, joint mobs  [x] Verbalizes understanding-caregiver  [] Demonstrates understanding. [] Needs review. [] Demonstrates/verbalizes HEP/Ed previously given. 09/30 HEP-Access Code: [email or web address]  URL: [email or web address]  Shoulder Flexion Wall Slide with Towel - 2-3 x daily - 7 x weekly - 10 reps - 5 seconds hold  Scaption Wall Slide with Towel - 2-3 x daily - 7 x weekly - 10 reps - 5 seconds hold       Plan: [x] Continue current frequency toward long and short term goals.     [x] Specific Instructions for subsequent treatments: progress shoulder stretches per Pt tolerance, add supine wand ex, scap retractions, update HEP, add finger ladder soon    Per Order: Please work on active and passive ROM of the left shoulder. WBAT LUE. Patient is nearly 3 months out from a left proximal humerus fracture treated nonoperatively. Patient has no restrictions.     Time In:0800            Time Out: 0838    Electronically signed by:  Kurt Brown, PT

## 2022-10-18 ENCOUNTER — HOSPITAL ENCOUNTER (OUTPATIENT)
Dept: PHYSICAL THERAPY | Age: 67
Setting detail: THERAPIES SERIES
Discharge: HOME | End: 2022-10-18
Payer: MEDICARE

## 2022-10-18 PROCEDURE — 97110 THERAPEUTIC EXERCISES: CPT

## 2022-10-18 PROCEDURE — 97140 MANUAL THERAPY 1/> REGIONS: CPT

## 2022-10-18 NOTE — FLOWSHEET NOTE
[x] [facility]  [address]  [phone]  F: [phone]         Physical Therapy Daily Treatment Note    Date:  10/18/2022  Patient Name:  Jena Garcia"  :  1955  MRN: [id number]  Physician: Tate Stroud MD; [address]: Medicare/Medicaid  Medical Diagnosis:  Closed fracture of proximal end of left humerus S42.                   Rehab Codes: M25.512, M25.612, M62.512, R29.3  Onset Date: 2022                    Next 's appt: unknown   Visit# / total visits: 3/10; Progress note for Medicare patient due at visit 10     Cancels/No Shows: 0/0    Subjective:  Pt's caregiver Clearance Prashant present w/Pt throughout Rx. Pt is non-verbal.   Pain:  [x] Yes  [] No Location: L Shoulder  Pain Rating: (0-10 scale) 0/10  Pain altered Tx:  [x] No  [] Yes  Action:  Comments: Pt reports shoulder is feeling ok through non-verbals (nodding) at start of Rx.      Objective:  Modalities:   Precautions: Heart Murmur, Hiatal Hernia, Pt is non-verbal.  Exercises:  Exercise Reps/ Time Weight/ Level Comments   UBE  4min ea ML2.0 Fwd/Rev, progressed resistance 10/18   Corner stretch  3x 30sec Progressed hold time 10/18         Scap Retractions  *  Add soon   Finger ladder *  Add soon         Wall Slides flex, scaption  10x 3sec    Wall circles 15x  difficulty going ccw         Wand flex 15x 1 lb    -abd 15x 3sec    -ER 15x 3sec Added 10/18   -ext 15x     -IR 15x  Behind back         ER stretch  3x 15sec On window ledge, Progressed hold time 10/18         Supine       Wand chest press 15x 1 lb Added 10/18   -flex 15x 1 lb Added 10/18         Mobs, PROM L shoulder  15 min  All planes, UE distraction, inf, post, distraction mobs                                 Other:      Treatment Charges: Mins Units   []  Modalities     [x]  Ther Exercise 34 2   [x]  Manual Therapy 15 1   []  Ther Activities     []  Aquatics []  Vasocompression     []  Other     Total Treatment time 49 3       Assessment: [x] Progressing toward goals. Initiated supine wand chest press and flex, and standing wand ER. Pt w/painful non-verbals a few reps with lowering from wand flex in supine, gentle distraction and oscillations by PT afterward to decrease pain. Cont PROM at end of Rx. Educated Pt and caregiver in standing wand ex for HEP, issued handouts, instructed in options can use for ex, including broomstick, golf club, or yardstick. [x] No change. Pt requires continuous verbal and tactile cues to perform ex correctly. Pt w/facial grimacing, nods when asked if painful at end range PROM all planes. [] Other:  [x] Patient would continue to benefit from skilled physical therapy services in order to: decrease pain L Shoulder, increase ROM L Shoulder, increase strength L UE, and improve reaching overhead. STG: (to be met in 10 treatments)  ? Pain: Pt able to reach overhead w/out pain (noted through non-verbals)  ? ROM:L Shoulder seated flex 120°, abd 100°, IR L3; Supine flex 140°, abd 110°, ER 20° (@ 90° abd) to perform ADLs and household activities   ? Strength: Pt able to lift 3 lbs overhead to improve ADLs and activities at home   ? Function: UEFI 4% loss of UE function   Patient to be independent with home exercise program as demonstrated by performance with correct form without cues. Pt. Education:  [x] Yes  [] No  [] Reviewed Prior HEP/Ed  Method of Education: [x] Verbal  [x] Demo  [x] Written  Comprehension of Education:   [x] Verbalizes understanding-caregiver  [] Demonstrates understanding. [x] Needs review-Pt  [] Demonstrates/verbalizes HEP/Ed previously given. 09/30 HEP-Access Code: [email or web address]  URL: [employer]. [employer]/  Shoulder Flexion Wall Slide with Towel - 2-3 x daily - 7 x weekly - 10 reps - 5 seconds hold  Scaption Wall Slide with Towel - 2-3 x daily - 7 x weekly - 10 reps - 5 seconds hold    10/18 HEP-Access Code: [email or web address]  URL: [employer]. [employer]/  Standing Shoulder Flexion AAROM with Dowel - 2 x daily - 2-3 sets - 20 reps  Shoulder Scaption AAROM with Dowel - 2-3 x daily - 7 x weekly - 20 reps  Standing Shoulder External Rotation AAROM with Dowel - 2 x daily - 2-3 sets - 20 reps  Standing Shoulder Internal Rotation AAROM with Dowel - 2 x daily - 2-3 sets - 20 reps  Standing Shoulder Extension with Dowel - 2-3 x daily - 7 x weekly - 20 reps  Seated Shoulder External Rotation PROM on Table - 2-3 x daily - 7 x weekly - 5 reps - 20 seconds hold         Plan: [x] Continue current frequency toward long and short term goals. [x] Specific Instructions for subsequent treatments: progress shoulder stretches per Pt tolerance, additional supine wand ex, scap retractions, add finger ladder soon    Per Order: Please work on active and passive ROM of the left shoulder. WBAT LUE. Patient is nearly 3 months out from a left proximal humerus fracture treated nonoperatively. Patient has no restrictions.     Time In:1300            Time Out: 1358    Electronically signed by:  Joy Pantoja, PT

## 2022-10-21 ENCOUNTER — HOSPITAL ENCOUNTER (OUTPATIENT)
Dept: PHYSICAL THERAPY | Age: 67
Setting detail: THERAPIES SERIES
Discharge: HOME | End: 2022-10-21
Payer: MEDICARE

## 2022-10-21 PROCEDURE — 97140 MANUAL THERAPY 1/> REGIONS: CPT

## 2022-10-21 PROCEDURE — 97110 THERAPEUTIC EXERCISES: CPT

## 2022-10-21 NOTE — FLOWSHEET NOTE
[x] [facility]  [address]  [phone]  F: [phone]         Physical Therapy Daily Treatment Note    Date:  10/21/2022  Patient Name:  Sergey Hannah"  :  1955  MRN: [id number]  Physician: Bronson Maciel MD; [address]: Medicare/Medicaid  Medical Diagnosis:  Closed fracture of proximal end of left humerus S42.                   Rehab Codes: M25.512, M25.612, M62.512, R29.3  Onset Date: 2022                    Next 's appt: unknown   Visit# / total visits: 4/10; Progress note for Medicare patient due at visit 10     Cancels/No Shows: 0/0    Subjective:  Pt's caregiver Salma Morris present w/Pt throughout Rx. Pt is non-verbal.   Pain:  [x] Yes  [] No Location: L Shoulder  Pain Rating: (0-10 scale) 0/10  Pain altered Tx:  [x] No  [] Yes  Action:  Comments: Pt shakes head no when asked if having any pain, nods head yes when asked if shoulder is feeling ok.     Objective:  Modalities:   Precautions: Heart Murmur, Hiatal Hernia, Pt is non-verbal.  Exercises:  Exercise Reps/ Time Weight/ Level Comments   UBE  4min ea ML2.0 Fwd/Rev   Corner stretch  3x 30sec          Scap Retractions  5x 3sec Added 10/21 soon   Finger ladder 5x  Added 10/21, gets up to L17-19         Wall Slides flex, scaption  10x 3sec    Wall circles 20x  difficulty going ccw, progressed reps 10/21         Wand flex 15x 1 lb    -abd 15x 3sec    -ER 15x 3sec    -ext 15x     -IR 15x  Behind back         ER stretch  3x 15sec On window ledge         Supine       Wand chest press 15x 1 lb    -flex 15x 1 lb          Mobs, PROM L shoulder  14 min  All planes, UE distraction, inf, post, distraction mobs                                 Other:      Treatment Charges: Mins Units   []  Modalities     [x]  Ther Exercise 32 2   [x]  Manual Therapy 14 1   []  Ther Activities     []  Aquatics     []  Vasocompression     []  Other Total Treatment time 46 3       Assessment: [x] Progressing toward goals. Initiated scap retractions and finger ladder to improve reaching up and reaching back for donning/doffing clothes. Pt w/painful non-verbals shoulder flex stretches on wall and finger ladder. Cont PROM at end of Rx. [x] No change. Pt requires continuous verbal and tactile cues to perform ex correctly. Pt w/facial grimacing, nods when asked if painful at end range PROM all planes. [] Other:  [x] Patient would continue to benefit from skilled physical therapy services in order to: decrease pain L Shoulder, increase ROM L Shoulder, increase strength L UE, and improve reaching overhead. STG: (to be met in 10 treatments)  ? Pain: Pt able to reach overhead w/out pain (noted through non-verbals)  ? ROM:L Shoulder seated flex 120°, abd 100°, IR L3; Supine flex 140°, abd 110°, ER 20° (@ 90° abd) to perform ADLs and household activities   ? Strength: Pt able to lift 3 lbs overhead to improve ADLs and activities at home   ? Function: UEFI 4% loss of UE function   Patient to be independent with home exercise program as demonstrated by performance with correct form without cues. Pt. Education:  [] Yes  [] No  [] Reviewed Prior HEP/Ed  Method of Education: [x] Verbal  [x] Demo  [] Written  Comprehension of Education:   [] Verbalizes understanding  [] Demonstrates understanding. [x] Needs review-Pt- correct technique new ex  [] Demonstrates/verbalizes HEP/Ed previously given. 09/30 HEP-Access Code: [email or web address]  URL: [employer]. [employer]/  Shoulder Flexion Wall Slide with Towel - 2-3 x daily - 7 x weekly - 10 reps - 5 seconds hold  Scaption Wall Slide with Towel - 2-3 x daily - 7 x weekly - 10 reps - 5 seconds hold    Access Code: [email or web address]  URL: [employer]. [employer]/  Standing Shoulder Flexion AAROM with Dowel - 2 x daily - 2-3 sets - 20 reps  Shoulder Scaption AAROM with Dowel - 2-3 x daily - 7 x weekly - 20 reps  Standing Shoulder External Rotation AAROM with Dowel - 2 x daily - 2-3 sets - 20 reps  Standing Shoulder Internal Rotation AAROM with Dowel - 2 x daily - 2-3 sets - 20 reps  Standing Shoulder Extension with Dowel - 2-3 x daily - 7 x weekly - 20 reps  Seated Shoulder External Rotation PROM on Table - 2-3 x daily - 7 x weekly - 5 reps - 20 seconds hold         Plan: [x] Continue current frequency toward long and short term goals. [x] Specific Instructions for subsequent treatments: progress shoulder stretches per Pt tolerance, additional supine wand ex soon    Per Order: Please work on active and passive ROM of the left shoulder. WBAT LUE. Patient is nearly 3 months out from a left proximal humerus fracture treated nonoperatively. Patient has no restrictions.     Time In:1101            Time Out: 1155    Electronically signed by:  Carmelita Rodarte PT

## 2022-10-25 ENCOUNTER — HOSPITAL ENCOUNTER (OUTPATIENT)
Dept: PHYSICAL THERAPY | Age: 67
Setting detail: THERAPIES SERIES
Discharge: HOME | End: 2022-10-25
Payer: MEDICARE

## 2022-10-25 PROCEDURE — 97140 MANUAL THERAPY 1/> REGIONS: CPT

## 2022-10-25 PROCEDURE — 97110 THERAPEUTIC EXERCISES: CPT

## 2022-10-25 NOTE — FLOWSHEET NOTE
[x] [facility]  [address]  [phone]  F: [phone]         Physical Therapy Daily Treatment Note    Date:  10/25/2022  Patient Name:  Elisa Marcum"  :  1955  MRN: [id number]  Physician: Sudha Murrell MD; [address]: Medicare/Medicaid  Medical Diagnosis:  Closed fracture of proximal end of left humerus S4                   Rehab Codes: M25.512, M25.612, M62.512, R29.3  Onset Date: 2022                    Next 's appt: unknown   Visit# / total visits: 5/10; Progress note for Medicare patient due at visit 10     Cancels/No Shows: 0/0    Subjective:  Pt's caregiver Bereket Baer present w/Pt, in waiting room during Rx. Pt is non-verbal.   Pain:  [x] Yes  [] No Location: L Shoulder  Pain Rating: (0-10 scale) 0/10  Pain altered Tx:  [x] No  [] Yes  Action:  Comments: Pt nods head when asked if shoulder feels ok.   Note Pt w/modified donning/doffing jacket to avoid reaching too high    Objective:  Modalities:   Precautions: Heart Murmur, Hiatal Hernia, Pt is non-verbal.  Exercises:  Exercise Reps/ Time Weight/ Level Comments   UBE  4min ea ML2.0 Fwd/Rev   Pulleys  *  Trial soon   Corner stretch  3x 30sec          Scap Retractions  5x 3sec    Finger ladder 5x ea  gets up to L20, added scaption 10/25 up to L20         Wall Slides flex, scaption  10x 3sec    Wall circles 20x  difficulty going ccw, greater cues needed for ccw         Wand flex 15x 2 lb Progressed weight 10   -abd 15x 3sec    -ER 15x 3sec    -ext 15x     -IR 15x  Behind back         ER stretch  3x 15sec On window ledge         Supine       Wand chest press 15x 2 lb Progress weight 1025   -flex 15x 2 lb Progress weight 1025   -horiz abd 10x 1 lb Added 10/25         Mobs, PROM L shoulder  14 min  All planes, UE distraction, inf, post, distraction mobs                                 Other:      Treatment Charges: Mins Units   []  Modalities     [x]  Ther Exercise 31 2   [x]  Manual Therapy 14 1   []  Ther Activities     []  Aquatics     []  Vasocompression     []  Other     Total Treatment time 45 3       Assessment: [x] Progressing toward goals. Progressed weight wand ex this date, note Pt w/decreased ROM abd this date. Initiated scaption finger ladder and supine wand horiz abd to improve reaching out to side. [x] No change. Pt requires continuous verbal and tactile cues to perform ex correctly. Cont PROM, mid way through ex this date as wall not available, note slightly decreased ROM all planes this dated compared to prior Rx. Pt w/facial grimacing, nods when asked if painful at end range PROM all planes. [] Other:  [x] Patient would continue to benefit from skilled physical therapy services in order to: decrease pain L Shoulder, increase ROM L Shoulder, increase strength L UE, and improve reaching overhead. STG: (to be met in 10 treatments)  ? Pain: Pt able to reach overhead w/out pain (noted through non-verbals)  ? ROM:L Shoulder seated flex 120°, abd 100°, IR L3; Supine flex 140°, abd 110°, ER 20° (@ 90° abd) to perform ADLs and household activities   ? Strength: Pt able to lift 3 lbs overhead to improve ADLs and activities at home   ? Function: UEFI 4% loss of UE function   Patient to be independent with home exercise program as demonstrated by performance with correct form without cues. Pt. Education:  [] Yes  [] No  [] Reviewed Prior HEP/Ed  Method of Education: [x] Verbal  [x] Demo  [] Written  Comprehension of Education:   [] Verbalizes understanding  [] Demonstrates understanding. [x] Needs review-Pt- correct technique new ex  [] Demonstrates/verbalizes HEP/Ed previously given. 09/30 HEP-Access Code: [email or web address]  URL: [employer]. [employer]/  Shoulder Flexion Wall Slide with Towel - 2-3 x daily - 7 x weekly - 10 reps - 5 seconds hold  Scaption Wall Slide with Towel - 2-3 x daily - 7 x weekly - 10 reps - 5 seconds hold    Access Code: [email or web address]  URL: [employer]. [employer]/  Standing Shoulder Flexion AAROM with Dowel - 2 x daily - 2-3 sets - 20 reps  Shoulder Scaption AAROM with Dowel - 2-3 x daily - 7 x weekly - 20 reps  Standing Shoulder External Rotation AAROM with Dowel - 2 x daily - 2-3 sets - 20 reps  Standing Shoulder Internal Rotation AAROM with Dowel - 2 x daily - 2-3 sets - 20 reps  Standing Shoulder Extension with Dowel - 2-3 x daily - 7 x weekly - 20 reps  Seated Shoulder External Rotation PROM on Table - 2-3 x daily - 7 x weekly - 5 reps - 20 seconds hold         Plan: [x] Continue current frequency toward long and short term goals. [x] Specific Instructions for subsequent treatments: progress shoulder stretches per Pt tolerance, trial pulleys     Per Order: Please work on active and passive ROM of the left shoulder. WBAT LUE. Patient is nearly 3 months out from a left proximal humerus fracture treated nonoperatively. Patient has no restrictions.     Time In:1300            Time Out: 1354    Electronically signed by:  Margaux Reyes, PT

## 2022-10-28 ENCOUNTER — HOSPITAL ENCOUNTER (OUTPATIENT)
Dept: PHYSICAL THERAPY | Age: 67
Setting detail: THERAPIES SERIES
Discharge: HOME | End: 2022-10-28
Payer: MEDICARE

## 2022-10-28 PROCEDURE — 97140 MANUAL THERAPY 1/> REGIONS: CPT

## 2022-10-28 PROCEDURE — 97110 THERAPEUTIC EXERCISES: CPT

## 2022-10-28 NOTE — FLOWSHEET NOTE
[x] [facility]  [address]  [phone]  F: [phone]         Physical Therapy Daily Treatment Note    Date:  10/28/2022  Patient Name:  Ileana Sanchez"  :  1955  MRN: [id number]  Physician: Ana Cristina Jeffries MD; [address]: Medicare/Medicaid  Medical Diagnosis:  Closed fracture of proximal end of left humerus S4.                   Rehab Codes: M25.512, M25.612, M62.512, R29.3  Onset Date: 2022                    Next 's appt: unknown   Visit# / total visits: 5/10; Progress note for Medicare patient due at visit 10     Cancels/No Shows: 0/0    Subjective:  Pt's caregiver Lucas Daniels present w/Pt, throughout Rx. Pt is non-verbal.   Pain:  [x] Yes  [] No Location: L Shoulder  Pain Rating: (0-10 scale) 0/10  Pain altered Tx:  [x] No  [] Yes  Action:  Comments: Pt nods yes when asked if shoulder is feeling ok, shakes head no when asked if having any pain. Objective:  Modalities:   Precautions: Heart Murmur, Hiatal Hernia, Pt is non-verbal.  Exercises:  Exercise Reps/ Time Weight/ Level Comments   UBE  4min ea ML2.0 Fwd/Rev   Pulleys  *  Trial soon   Corner stretch  3x 30sec          Scap Retractions  5x 3sec Pt w/large amount difficulty w/correct technique despite large amount tactile, verbal and visual cues. Finger ladder 5x ea  flex up to L20, scaption up to L20; held 10/28 unavailable          Wall Slides flex, scaption  10x 3sec    Wall circles 20x  difficulty going ccw, greater cues needed for ccw         Wand flex 15x 2 lb    -abd 15x 3sec    -ER 15x 3sec    -ext 15x     -IR 15x  Behind back         ER stretch  3x 20sec On window ledge.  Progressed hold time, reps 10/28         Supine       Wand chest press 15x 2 lb    -flex 15x 2 lb    -horiz abd 10x 1 lb          Mobs, PROM L shoulder  15 min  All planes, UE distraction, inf, post, distraction mobs Other:      Treatment Charges: Mins Units   []  Modalities     [x]  Ther Exercise 28 2   [x]  Manual Therapy 15 1   []  Ther Activities     []  Aquatics     []  Vasocompression     []  Other     Total Treatment time 43 3       Assessment: [x] Progressing toward goals. Progressed reps ER stretch on window ledge to improve reaching back of head. Performed wand ext facing wall this date to prevent compensation w/trunk flex, w/improved performance. [x] No change. Pt requires continuous verbal and tactile cues to perform ex correctly. Cont PROM. Pt w/facial grimacing, nods when asked if painful at end range PROM all planes. [] Other:  [x] Patient would continue to benefit from skilled physical therapy services in order to: decrease pain L Shoulder, increase ROM L Shoulder, increase strength L UE, and improve reaching overhead. STG: (to be met in 10 treatments)  ? Pain: Pt able to reach overhead w/out pain (noted through non-verbals)  ? ROM:L Shoulder seated flex 120°, abd 100°, IR L3; Supine flex 140°, abd 110°, ER 20° (@ 90° abd) to perform ADLs and household activities   ? Strength: Pt able to lift 3 lbs overhead to improve ADLs and activities at home   ? Function: UEFI 4% loss of UE function   Patient to be independent with home exercise program as demonstrated by performance with correct form without cues. Pt. Education:  [] Yes  [] No  [] Reviewed Prior HEP/Ed  Method of Education: [x] Verbal  [x] Demo  [] Written  Comprehension of Education:   [] Verbalizes understanding  [] Demonstrates understanding. [x] Needs review-Pt- correct technique new ex  [] Demonstrates/verbalizes HEP/Ed previously given. 09/30 HEP-Access Code: [email or web address]  URL: [employer]/  Shoulder Flexion Wall Slide with Towel - 2-3 x daily - 7 x weekly - 10 reps - 5 seconds hold  Scaption Wall Slide with Towel - 2-3 x daily - 7 x weekly - 10 reps - 5 seconds hold    Access Code: [email or web address]  URL: [employer]. [employer]/  Standing Shoulder Flexion AAROM with Dowel - 2 x daily - 2-3 sets - 20 reps  Shoulder Scaption AAROM with Dowel - 2-3 x daily - 7 x weekly - 20 reps  Standing Shoulder External Rotation AAROM with Dowel - 2 x daily - 2-3 sets - 20 reps  Standing Shoulder Internal Rotation AAROM with Dowel - 2 x daily - 2-3 sets - 20 reps  Standing Shoulder Extension with Dowel - 2-3 x daily - 7 x weekly - 20 reps  Seated Shoulder External Rotation PROM on Table - 2-3 x daily - 7 x weekly - 5 reps - 20 seconds hold         Plan: [x] Continue current frequency toward long and short term goals. [x] Specific Instructions for subsequent treatments: progress shoulder stretches per Pt tolerance, trial pulleys     Per Order: Please work on active and passive ROM of the left shoulder. WBAT LUE. Patient is nearly 3 months out from a left proximal humerus fracture treated nonoperatively. Patient has no restrictions.     Time In:1300            Time Out: 1351    Electronically signed by:  Carmelita Rodarte PT

## 2022-11-02 ENCOUNTER — HOSPITAL ENCOUNTER (OUTPATIENT)
Dept: PHYSICAL THERAPY | Age: 67
Setting detail: THERAPIES SERIES
Discharge: HOME | End: 2022-11-02
Payer: MEDICARE

## 2022-11-02 PROCEDURE — 97110 THERAPEUTIC EXERCISES: CPT

## 2022-11-02 PROCEDURE — 97140 MANUAL THERAPY 1/> REGIONS: CPT

## 2022-11-02 NOTE — FLOWSHEET NOTE
[x] [facility]  [address]  [phone]  F: [phone]         Physical Therapy Daily Treatment Note    Date:  2022  Patient Name:  Kellen Best"  :  1955  MRN: [id number]  Physician: Cristine Mane MD; [address]: Medicare/Medicaid  Medical Diagnosis:  Closed fracture of proximal end of left humerus S42.                   Rehab Codes: M25.512, M25.612, M62.512, R29.3  Onset Date: 2022                    Next 's appt: unknown   Visit# / total visits: 6/10; Progress note for Medicare patient due at visit 10     Cancels/No Shows: 0/0    Subjective:  Pt's caregiver Piotrwin Nipple present w/Pt, in waiting room during Rx. Pt is non-verbal.   Pain:  [x] Yes  [] No Location: L Shoulder  Pain Rating: (0-10 scale) 0/10  Pain altered Tx:  [x] No  [] Yes  Action:  Comments: Pt arrived 10 min late for appt. Pt nods yes when asked if shoulder is feeling ok. Pt shakes head no when asked if having any pain in shoulder    Objective:  Modalities:   Precautions: Heart Murmur, Hiatal Hernia, Pt is non-verbal.  Exercises:  Exercise Reps/ Time Weight/ Level Completed 2022   Comments   UBE  4min ea ML4.0 x Fwd/Rev; progressed resistance 11/2   Pulleys     Flex, added 11/2   Corner stretch  3x 30sec x           Scap Retractions  5x 3sec  Pt w/large amount difficulty w/correct technique despite large amount tactile, verbal and visual cues.     Finger ladder 5x ea  x flex up to L21, scaption up to L21          Wall Slides flex, scaption  10x 3sec     Wall circles 20x   difficulty going ccw, greater cues needed for ccw          Wand flex 15x 2 lb x Back against wall    -abd 15x 3sec x    -ER 15x 3sec x    -ext 15x  x facing wall to prevent trunk forward flexion    -IR 15x  x Behind back, facing wall to prevent trunk forward flexion           ER stretch  3x 30sec x On window ledge, progressed hold time 11/2          Supine        Wand chest press 15x 2 lb x    -flex 15x 2 lb x    -horiz abd 10x 1 lb            Mobs, PROM L shoulder  10 min  x All planes, UE distraction, inf, post, distraction mobs                                      Other:      Treatment Charges: Mins Units   []  Modalities     [x]  Ther Exercise 30 2   [x]  Manual Therapy 10 1   []  Ther Activities     []  Aquatics     []  Vasocompression     []  Other     Total Treatment time 40 3       Assessment: [x] Progressing toward goals. Note Pt dons/doffs coat modified to avoid lifting L arm. Began Rx w/UBE to increase circulation and warm muscles to improve stretching, Pt able to perform w/increased resistance but decreases speed. Initiated pulleys for flex and abd stretching to improve reaching up and improve dressing and ADLs. Pt w/improved flex and scaption ROM during finger ladder and wand ex. Cont PROM at end of ex, note increased flex ROM. [x] No change. Pt requires continuous verbal and tactile cues to perform ex correctly. Pt w/facial grimacing, nods when asked if painful at end range PROM all planes. [] Other:  [x] Patient would continue to benefit from skilled physical therapy services in order to: decrease pain L Shoulder, increase ROM L Shoulder, increase strength L UE, and improve reaching overhead. STG: (to be met in 10 treatments)  ? Pain: Pt able to reach overhead w/out pain (noted through non-verbals)  ? ROM:L Shoulder seated flex 120°, abd 100°, IR L3; Supine flex 140°, abd 110°, ER 20° (@ 90° abd) to perform ADLs and household activities   ? Strength: Pt able to lift 3 lbs overhead to improve ADLs and activities at home   ? Function: UEFI 4% loss of UE function   Patient to be independent with home exercise program as demonstrated by performance with correct form without cues.        Pt. Education:  [] Yes  [] No  [] Reviewed Prior HEP/Ed  Method of Education: [x] Verbal  [x] Demo  [] Written  Comprehension of Education:   [] Verbalizes understanding  [] Demonstrates understanding. [x] Needs review-Pt- correct technique new ex  [] Demonstrates/verbalizes HEP/Ed previously given. 09/30 HEP-Access Code: [email or web address]  URL: [email or web address]  Shoulder Flexion Wall Slide with Towel - 2-3 x daily - 7 x weekly - 10 reps - 5 seconds hold  Scaption Wall Slide with Towel - 2-3 x daily - 7 x weekly - 10 reps - 5 seconds hold    Access Code: [email or web address]  URL: [email or web address]  Standing Shoulder Flexion AAROM with Dowel - 2 x daily - 2-3 sets - 20 reps  Shoulder Scaption AAROM with Dowel - 2-3 x daily - 7 x weekly - 20 reps  Standing Shoulder External Rotation AAROM with Dowel - 2 x daily - 2-3 sets - 20 reps  Standing Shoulder Internal Rotation AAROM with Dowel - 2 x daily - 2-3 sets - 20 reps  Standing Shoulder Extension with Dowel - 2-3 x daily - 7 x weekly - 20 reps  Seated Shoulder External Rotation PROM on Table - 2-3 x daily - 7 x weekly - 5 reps - 20 seconds hold         Plan: [x] Continue current frequency toward long and short term goals. [x] Specific Instructions for subsequent treatments: progress shoulder stretches per Pt tolerance    Per Order: Please work on active and passive ROM of the left shoulder. WBAT LUE. Patient is nearly 3 months out from a left proximal humerus fracture treated nonoperatively. Patient has no restrictions.     Time In:0810            Time Out: 3580    Electronically signed by:  Taurus Rodriguez, PT

## 2022-11-04 ENCOUNTER — APPOINTMENT (OUTPATIENT)
Dept: PHYSICAL THERAPY | Age: 67
End: 2022-11-04
Payer: MEDICARE

## 2022-11-08 ENCOUNTER — HOSPITAL ENCOUNTER (OUTPATIENT)
Dept: PHYSICAL THERAPY | Age: 67
Setting detail: THERAPIES SERIES
Discharge: HOME | End: 2022-11-08
Payer: MEDICARE

## 2022-11-08 PROCEDURE — 97140 MANUAL THERAPY 1/> REGIONS: CPT

## 2022-11-08 PROCEDURE — 97110 THERAPEUTIC EXERCISES: CPT

## 2022-11-08 NOTE — FLOWSHEET NOTE
[x] [facility]  [address]  [phone]  F: [phone]         Physical Therapy Daily Treatment Note    Date:  2022  Patient Name:  Sergey Hannah"  :  1955  MRN: [id number]  Physician: Bronson Maciel MD; [address]: Medicare/Medicaid  Medical Diagnosis:  Closed fracture of proximal end of left humerus S4.                   Rehab Codes: M25.512, M25.612, M62.512, R29.3  Onset Date: 2022                    Next 's appt: unknown   Visit# / total visits: 7/10; Progress note for Medicare patient due at visit 10     Cancels/No Shows: 0/0    Subjective:  Pt's caregiver Salma Morris present w/Pt, in waiting room during Rx. Pt is non-verbal.   Pain:  [x] Yes  [] No Location: L Shoulder  Pain Rating: (0-10 scale) 0/10  Pain altered Tx:  [x] No  [] Yes  Action:  Comments:  Pt nods yes when asked if shoulder is feeling ok today. Objective:  Modalities:   Precautions: Heart Murmur, Hiatal Hernia, Pt is non-verbal.  Exercises:  Exercise Reps/ Time Weight/ Level Completed 2022   Comments   UBE  4min ea ML4.0 x Fwd/Rev   Pulleys  2 min  x Flex, added abd    Corner stretch  3x 30sec x           Scap Retractions  5x 3sec  Pt w/large amount difficulty w/correct technique despite large amount tactile, verbal and visual cues.     Finger ladder 5x ea  x flex up to L21, scaption up to L21          Wall Slides flex, scaption  10x 3sec x    Wall circles 20x  x difficulty going ccw, greater cues needed for ccw          Wand flex 15x 2 lb x Back against wall    -abd 15x 3sec x    -ER 15x 3sec x    -ext 15x  x facing wall to prevent trunk forward flexion    -IR 15x  x Behind back, facing wall to prevent trunk forward flexion           ER stretch  3x 30sec x On window ledge          Tband     Add soon   -triceps       -ext       -rows              Supine        Wand chest press 15x 2 lb x    -flex 15x 2 lb x    -horiz abd 10x 1 lb            Mobs, PROM L shoulder  11 min  x All planes, UE distraction, inf, post, distraction mobs                                      Other:      Treatment Charges: Mins Units   []  Modalities     [x]  Ther Exercise 33 2   [x]  Manual Therapy 11 1   []  Ther Activities     []  Aquatics     []  Vasocompression     []  Other     Total Treatment time 44 3       Assessment: [x] Progressing toward goals. Pt cont to don/doff coat modified to avoid lifting L arm. Initiated pulleys into abd to improve reaching up to side. Cont PROM at end of ex, note increased flex ROM. [x] No change. Pt requires continuous verbal and tactile cues to perform ex correctly. Pt w/facial grimacing at end range PROM all planes. [] Other:  [x] Patient would continue to benefit from skilled physical therapy services in order to: decrease pain L Shoulder, increase ROM L Shoulder, increase strength L UE, and improve reaching overhead. STG: (to be met in 10 treatments)  ? Pain: Pt able to reach overhead w/out pain (noted through non-verbals)  ? ROM:L Shoulder seated flex 120°, abd 100°, IR L3; Supine flex 140°, abd 110°, ER 20° (@ 90° abd) to perform ADLs and household activities   ? Strength: Pt able to lift 3 lbs overhead to improve ADLs and activities at home   ? Function: UEFI 4% loss of UE function   Patient to be independent with home exercise program as demonstrated by performance with correct form without cues. Pt. Education:  [] Yes  [] No  [] Reviewed Prior HEP/Ed  Method of Education: [x] Verbal  [x] Demo  [] Written  Comprehension of Education:   [] Verbalizes understanding  [] Demonstrates understanding. [x] Needs review-Pt- correct technique new ex  [] Demonstrates/verbalizes HEP/Ed previously given. 09/30 HEP-Access Code: [email or web address]  URL: [email or web address] [email or web address]  Shoulder Flexion Wall Slide with Towel - 2-3 x daily - 7 x weekly - 10 reps - 5 seconds

## 2022-11-18 ENCOUNTER — HOSPITAL ENCOUNTER (OUTPATIENT)
Dept: PHYSICAL THERAPY | Age: 67
Setting detail: THERAPIES SERIES
Discharge: HOME | End: 2022-11-18
Payer: MEDICARE

## 2022-11-18 PROCEDURE — 97110 THERAPEUTIC EXERCISES: CPT

## 2022-11-18 PROCEDURE — 97140 MANUAL THERAPY 1/> REGIONS: CPT

## 2022-11-18 NOTE — FLOWSHEET NOTE
[x] [facility]  [address]  [phone]  F: [phone]         Physical Therapy Daily Treatment Note    Date:  2022  Patient Name:  Elbert Covarrubias"  :  1955  MRN: [id number]  Physician: Manny Jovel MD; [address]: Medicare/Medicaid  Medical Diagnosis:  Closed fracture of proximal end of left humerus S42.                   Rehab Codes: M25.512, M25.612, M62.512, R29.3  Onset Date: 2022                    Next 's appt: unknown   Visit# / total visits: 8/10; Progress note for Medicare patient due at visit 10     Cancels/No Shows: 0/0    Subjective:  Pt's caregiver Bishnu Singleton present w/Pt, in waiting room during Rx. Pt is non-verbal.   Pain:  [x] Yes  [] No Location: L Shoulder  Pain Rating: (0-10 scale) 0/10  Pain altered Tx:  [x] No  [] Yes  Action:  Comments: At start of Rx when asked if shoulder is ok Pt nods head yes. Objective:  Modalities:   Precautions: Heart Murmur, Hiatal Hernia, Pt is non-verbal.  Exercises:  Exercise Reps/ Time Weight/ Level Completed 2022   Comments   UBE  4min ea ML4.0 x Fwd/Rev   Pulleys  2 min flex;  1min abd  x Flex, abd    Corner stretch  3x 30sec x           Scap Retractions  5x 3sec  Pt w/large amount difficulty w/correct technique despite large amount tactile, verbal and visual cues.     Finger ladder 5x ea  x flex up to L21, scaption up to L21          Wall Slides flex, scaption  10x 3sec x    Wall circles 20x  x difficulty going ccw, greater cues needed for ccw          Wand flex 15x 2 lb x Back against wall    -abd 15x 3sec x    -ER 15x 3sec x    -ext 15x  x facing wall to prevent trunk forward flexion    -IR 15x  x Behind back, facing wall to prevent trunk forward flexion           ER stretch  3x 30sec x On window ledge          Tband     Added    -triceps 10x blue x    -ext 10x blue x    -rows 10x blue x Supine        Wand chest press 15x 2 lb     -flex 15x 2 lb     -horiz abd 10x 1 lb            Mobs, PROM L shoulder  12 min  x All planes, UE distraction, inf, post, distraction mobs                                      Other:      Treatment Charges: Mins Units   []  Modalities     [x]  Ther Exercise 34 2   [x]  Manual Therapy 12 1   []  Ther Activities     []  Aquatics     []  Vasocompression     []  Other     Total Treatment time 46 min        Assessment: [x] Progressing toward goals. Initiated tband rows, ext, and triceps to strengthen post shoulder and improve pushing and pulling. Cont PROM at end of ex. Note Pt still compensating for L UE when donning coat, instructed Pt to try putting coat on normal until next appt, both arms at same time, w/L UE up in air instead of down by side. [x] No change. Pt requires continuous verbal and tactile cues to perform ex correctly, cues when to change directions during UBE. Pt w/painful non-verbals at end range PROM all planes. [] Other:  [x] Patient would continue to benefit from skilled physical therapy services in order to: decrease pain L Shoulder, increase ROM L Shoulder, increase strength L UE, and improve reaching overhead. STG: (to be met in 10 treatments)  ? Pain: Pt able to reach overhead w/out pain (noted through non-verbals)  ? ROM:L Shoulder seated flex 120°, abd 100°, IR L3; Supine flex 140°, abd 110°, ER 20° (@ 90° abd) to perform ADLs and household activities   ? Strength: Pt able to lift 3 lbs overhead to improve ADLs and activities at home   ? Function: UEFI 4% loss of UE function   Patient to be independent with home exercise program as demonstrated by performance with correct form without cues. Pt. Education:  [] Yes  [] No  [] Reviewed Prior HEP/Ed  Method of Education: [x] Verbal  [x] Demo  [] Written  Comprehension of Education:   [] Verbalizes understanding  [] Demonstrates understanding.   [x] Needs review-Pt- correct technique new ex  [] Demonstrates/verbalizes HEP/Ed previously given. 09/30 HEP-Access Code: [email or web address]  URL: [email or web address]  Shoulder Flexion Wall Slide with Towel - 2-3 x daily - 7 x weekly - 10 reps - 5 seconds hold  Scaption Wall Slide with Towel - 2-3 x daily - 7 x weekly - 10 reps - 5 seconds hold    Access Code: [email or web address]  URL: [email or web address]  Standing Shoulder Flexion AAROM with Dowel - 2 x daily - 2-3 sets - 20 reps  Shoulder Scaption AAROM with Dowel - 2-3 x daily - 7 x weekly - 20 reps  Standing Shoulder External Rotation AAROM with Dowel - 2 x daily - 2-3 sets - 20 reps  Standing Shoulder Internal Rotation AAROM with Dowel - 2 x daily - 2-3 sets - 20 reps  Standing Shoulder Extension with Dowel - 2-3 x daily - 7 x weekly - 20 reps  Seated Shoulder External Rotation PROM on Table - 2-3 x daily - 7 x weekly - 5 reps - 20 seconds hold         Plan: [x] Continue current frequency toward long and short term goals. [x] Specific Instructions for subsequent treatments: progress shoulder stretches per Pt tolerance    Per Order: Please work on active and passive ROM of the left shoulder. WBAT LUE. Patient is nearly 3 months out from a left proximal humerus fracture treated nonoperatively. Patient has no restrictions.     Time TU:8166            Time Out: 8246    Electronically signed by:  Luis E Vasquez, PT Same Histology In Subsequent Stages Text: The pattern and morphology of the tumor is as described in the first stage.

## 2022-12-07 ENCOUNTER — HOSPITAL ENCOUNTER (OUTPATIENT)
Dept: PHYSICAL THERAPY | Age: 67
Setting detail: THERAPIES SERIES
Discharge: HOME | End: 2022-12-07
Payer: MEDICARE

## 2022-12-07 PROCEDURE — 97110 THERAPEUTIC EXERCISES: CPT

## 2022-12-07 PROCEDURE — 97140 MANUAL THERAPY 1/> REGIONS: CPT

## 2022-12-07 NOTE — THERAPY DISCHARGE
[x] [facility]  [address]  [phone]  F: [phone]         Physical Therapy Discharge Note    Date: 2022      Patient: Tomy Mccall  : 1955  MRN: [id number]    Physician: Stephane Eli MD; Brianne Leon                            Insurance: Medicare/Medicaid  Medical Diagnosis:  Closed fracture of proximal end of left humerus S4.                   Rehab Codes: M25.512, M25.612, M62.512, R29.3  Onset Date: 2022                    Next 's appt: unknown     Total visits attended: 9  Cancels/No shows: 0/1  Date of initial visit: 2022                Date of final visit: 2022      Subjective:  Pain:  [] Yes  [x] No  Location: L Shoulder Pain Rating: (0-10 scale) 0/10  Pain altered Tx:  [x] No  [] Yes  Action:  Comments: Pt nods head when asked if shoulder is ok at final Rx, no painful non-verbals or facial grimacing at final Rx. Pt's caregiver Brandy Elliott present w/Pt, for all appts. Pt is non-verbal.     Objective:  Test Measurements:    ROM            A/P     LEFT   SEATED SHLD FLEX 120°   SEATED SHD ABD 97°   SEATED IR L2         SHLD FLEX 137°   SHLD °   SHLD ER 38°   SHLD IR           ELBOW FLEX     ELBOW EXT. Note L shoulder flex and abd supine ROM min deficits compared to R. Pt w/improvement in ER ROM, but cont to be limited. Function: Pt able to lift 3 lbs in L UE overhead w/out difficulty, no facial grimacing. Pt w/improved technique, less guarding L UE w/donning coat. Assessment:  STG: (to be met in 10 treatments)  ? Pain: Pt able to reach overhead w/out pain (noted through non-verbals)-Met   ? ROM:L Shoulder seated flex 120°, abd 100°, IR L3; Supine flex 140°, abd 110°, ER 20° (@ 90° abd) to perform ADLs and household activities-Partially Met-Met for all but Shoulder Abd   ? Strength: Pt able to lift 3 lbs overhead to improve ADLs and activities at home-Met   ? Function: UEFI 4% loss of UE function   Patient to be independent with home exercise program as demonstrated by performance with correct form without cues-Met         Treatment to Date:  [x] Therapeutic Exercise    [] Modalities:  [] Therapeutic Activity    [] Ultrasound  [] Electrical Stimulation  [] Gait Training     [] Massage       [] Lumbar/Cervical Traction  [] Neuromuscular Re-education [] Cold/hotpack [] Iontophoresis: 4 mg/mL  [x] Instruction in Home Exercise Program                     Dexamethasone Sodium  [x] Manual Therapy             Phosphate 40-80 mAmin  [] Aquatic Therapy                   [] Vasocompression/    [] Other:             Game Ready    Discharge Status:     [] Pt recovered from conditions. Treatment goals were met. [x] Pt received maximum benefit. No further therapy indicated at this time. [x] Pt to continue exercise/home instructions independently. [] Therapy interrupted due to:    [] Pt has 2 or more no shows/cancels, is discontinued per our policy. [x] Pt has completed prescribed number of treatment sessions. [] Other:         Electronically signed by Bahman Varela PT on 12/7/2022 at 2:07 PM        If you have any questions or concerns, please don't hesitate to call.   Thank you for your referral.

## 2022-12-07 NOTE — FLOWSHEET NOTE
[x] [facility]  [address]  [phone]  F: [phone]         Physical Therapy Daily Treatment Note    Date:  2022  Patient Name:  Raudel Nguyen"  :  1955  MRN: [id number]  Physician: Prashanth Wright MD; [address]: Medicare/Medicaid  Medical Diagnosis:  Closed fracture of proximal end of left humerus S42.                   Rehab Codes: M25.512, M25.612, M62.512, R29.3  Onset Date: 2022                    Next 's appt: unknown   Visit# / total visits: 9/10; Progress note for Medicare patient due at visit 10     Cancels/No Shows: 0/1    Subjective:  Pt's caregiver Yvonne Pozo present w/Pt, in waiting room during Rx. Pt is non-verbal.   Pain:  [] Yes  [x] No Location: L Shoulder  Pain Rating: (0-10 scale) 0/10  Pain altered Tx:  [x] No  [] Yes  Action:  Comments:  Pt nods head when asked if shoulder is ok, no painful non-verbals or facial grimacing throughout Rx. Objective:  Modalities:   Precautions: Heart Murmur, Hiatal Hernia, Pt is non-verbal.  Exercises:  Exercise Reps/ Time Weight/ Level Completed 2022   Comments   UBE  4min ea ML4.0 x Fwd/Rev   Pulleys  2 min flex;  1min abd   Flex, abd    Corner stretch  3x 30sec x           Scap Retractions  5x 3sec  Pt w/large amount difficulty w/correct technique despite large amount tactile, verbal and visual cues.     Finger ladder 5x ea  x flex up to L21, scaption up to L21          Wall Slides flex, scaption  10x 3sec x    Wall circles 20x  x difficulty going ccw, greater verbal, tactile cues needed for ccw          Wand flex 15x 2 lb x Back against wall to prevent compensating w/trunk ext    -abd 15x 3sec x    -ER 15x 3sec x    -ext 15x  x facing wall to prevent trunk forward flexion    -IR 15x  x Behind back, facing wall to prevent trunk forward flexion           ER stretch  3x 30sec x On window ledge Tband     Added 11/18   -triceps 10x blue     -ext 10x blue     -rows 10x blue            Supine        Wand chest press 15x 3 lb x Progressed weight 12/7   -flex 15x 3 lb x Progressed weight 12/7   -horiz abd 10x 1 lb            Mobs, PROM L shoulder  14 min  x All planes, UE distraction, inf, post, distraction mobs                                      Other:    12/7 ROM            A/P    LEFT   SEATED SHLD FLEX 120°   SEATED SHD ABD 97°   SEATED IR L2       SHLD FLEX 137°   SHLD °   SHLD ER 38°   SHLD IR        ELBOW FLEX    ELBOW EXT. Pt able to lift 3 lbs in L UE overhead w/out difficulty, no facial grimacing. Treatment Charges: Mins Units   []  Modalities     [x]  Ther Exercise 34 2   [x]  Manual Therapy 14 1   []  Ther Activities     []  Aquatics     []  Vasocompression     []  Other     Total Treatment time 48 min        Assessment: [x] Progressing toward goals. Pt w/good progress in ROM and strength. Pt able to raise 3lb weight overhead w/L UE without difficulty. Note L shoulder flex and abd supine ROM mild deficits compared to R. Pt w/improvement in ER ROM, but cont to be limited. Note Pt w/improved technique, less guarding L UE w/donning coat at end of Rx. Discussed Pt's progress w/Pt and caregiver Cruz Menon, they are both agreeable to discharge. Instructed Pt and caregiver Pt should cont w/HEP (stretches). Educated Pt and caregiver if problems arise in future they should discuss w/physician and physician can order additional PT. [x] No change. Pt requires continuous verbal and tactile cues to perform ex correctly, cues when to change directions during UBE. Pt w/out painful non-verbals during PROM this date. [] Other:  [x] Patient would continue to benefit from skilled physical therapy services in order to: decrease pain L Shoulder, increase ROM L Shoulder, increase strength L UE, and improve reaching overhead. STG: (to be met in 10 treatments)  ?  Pain: Pt able to reach overhead w/out pain (noted through non-verbals)-Met   ? ROM:L Shoulder seated flex 120°, abd 100°, IR L3; Supine flex 140°, abd 110°, ER 20° (@ 90° abd) to perform ADLs and household activities-Partially Met-Met for all but Shoulder Abd   ? Strength: Pt able to lift 3 lbs overhead to improve ADLs and activities at home-Met   ? Function: UEFI 4% loss of UE function   Patient to be independent with home exercise program as demonstrated by performance with correct form without cues-Met        Pt. Education:  [] Yes  [] No  [] Reviewed Prior HEP/Ed  Method of Education: [x] Verbal  [x] Demo  [] Written  Comprehension of Education:   [] Verbalizes understanding  [] Demonstrates understanding. [x] Needs review-Pt- correct technique new ex  [] Demonstrates/verbalizes HEP/Ed previously given. 09/30 HEP-Access Code: [email or web address]  URL: [employer]/  Shoulder Flexion Wall Slide with Towel - 2-3 x daily - 7 x weekly - 10 reps - 5 seconds hold  Scaption Wall Slide with Towel - 2-3 x daily - 7 x weekly - 10 reps - 5 seconds hold    Access Code: [email or web address]  URL: [email or web address]  Standing Shoulder Flexion AAROM with Dowel - 2 x daily - 2-3 sets - 20 reps  Shoulder Scaption AAROM with Dowel - 2-3 x daily - 7 x weekly - 20 reps  Standing Shoulder External Rotation AAROM with Dowel - 2 x daily - 2-3 sets - 20 reps  Standing Shoulder Internal Rotation AAROM with Dowel - 2 x daily - 2-3 sets - 20 reps  Standing Shoulder Extension with Dowel - 2-3 x daily - 7 x weekly - 20 reps  Seated Shoulder External Rotation PROM on Table - 2-3 x daily - 7 x weekly - 5 reps - 20 seconds hold         Plan: [] Continue current frequency toward long and short term goals. [] Specific Instructions for subsequent treatments: Discharge to [facility]    Per Order: Please work on active and passive ROM of the left shoulder. WBAT LUE.  Patient is nearly 3 months out from a left proximal humerus fracture treated nonoperatively. Patient has no restrictions.     Time In:1302            Time Out: 1358    Electronically signed by:  Cristal Glynn PT